# Patient Record
Sex: FEMALE | Employment: OTHER | URBAN - METROPOLITAN AREA
[De-identification: names, ages, dates, MRNs, and addresses within clinical notes are randomized per-mention and may not be internally consistent; named-entity substitution may affect disease eponyms.]

---

## 2017-05-18 ENCOUNTER — HOSPITAL ENCOUNTER (OUTPATIENT)
Dept: RADIOLOGY | Facility: OTHER | Age: 72
Discharge: HOME | End: 2017-05-18
Attending: INTERNAL MEDICINE
Payer: MEDICARE

## 2017-05-18 DIAGNOSIS — Z87.891 EX-SMOKER: ICD-10-CM

## 2017-05-18 DIAGNOSIS — R19.00 ABDOMINAL SWELLING: ICD-10-CM

## 2017-05-18 PROCEDURE — 76700 US EXAM ABDOM COMPLETE: CPT | Mod: TC

## 2017-05-18 PROCEDURE — 71020 XR CHEST PA AND LATERAL: CPT | Mod: 26,,, | Performed by: RADIOLOGY

## 2017-05-18 PROCEDURE — 71020 XR CHEST PA AND LATERAL: CPT | Mod: TC

## 2017-05-18 PROCEDURE — 76700 US EXAM ABDOM COMPLETE: CPT | Mod: 26,,, | Performed by: RADIOLOGY

## 2017-08-24 DIAGNOSIS — Z78.0 MENOPAUSE: ICD-10-CM

## 2017-08-24 NOTE — TELEPHONE ENCOUNTER
----- Message from Jamila Carney sent at 8/24/2017  1:49 PM CDT -----  Contact: Mica SMITH _1st Request  _  2nd Request  _  3rd Request    Who:Mica [employer]     Why:Mica was calling for a authorization for a refill on the patient medication  Prempro     What Number to Call Back:[phone]    When to Expect a call back: (Before the end of the day)   -- if call after 3:00 call back will be tomorrow.

## 2017-11-02 DIAGNOSIS — Z78.0 MENOPAUSE: ICD-10-CM

## 2017-11-02 RX ORDER — CONJUGATED ESTROGENS AND MEDROXYPROGESTERONE ACETATE .3; 1.5 MG/1; MG/1
TABLET, SUGAR COATED ORAL
Qty: 84 TABLET | Refills: 0 | Status: SHIPPED | OUTPATIENT
Start: 2017-11-02 | End: 2019-02-05 | Stop reason: SDUPTHER

## 2017-11-22 ENCOUNTER — TELEPHONE (OUTPATIENT)
Dept: OBSTETRICS AND GYNECOLOGY | Facility: CLINIC | Age: 72
End: 2017-11-22

## 2017-11-22 NOTE — TELEPHONE ENCOUNTER
Patient c/o vulva irritation, declines appointment today, patient scheduled in women's walk in today

## 2017-11-22 NOTE — TELEPHONE ENCOUNTER
----- Message from Jamila Carney sent at 11/22/2017 11:14 AM CST -----  Contact: Patient   X _1st Request  _  2nd Request  _  3rd Request    Who:TERRY FLORIAN [[id number]]    Why:Patient was returning a missed call back from the staff     What Number to Call Back:[phone]    When to Expect a call back: (Before the end of the day)   -- if call after 3:00 call back will be tomorrow.

## 2017-11-24 ENCOUNTER — TELEPHONE (OUTPATIENT)
Dept: OBSTETRICS AND GYNECOLOGY | Facility: CLINIC | Age: 72
End: 2017-11-24

## 2017-11-24 ENCOUNTER — OFFICE VISIT (OUTPATIENT)
Dept: OBSTETRICS AND GYNECOLOGY | Facility: CLINIC | Age: 72
End: 2017-11-24
Payer: MEDICARE

## 2017-11-24 VITALS
DIASTOLIC BLOOD PRESSURE: 72 MMHG | BODY MASS INDEX: 24.03 KG/M2 | WEIGHT: 149.5 LBS | HEIGHT: 66 IN | SYSTOLIC BLOOD PRESSURE: 116 MMHG

## 2017-11-24 DIAGNOSIS — N76.3 SUBACUTE VULVITIS: Primary | ICD-10-CM

## 2017-11-24 DIAGNOSIS — B37.9 CANDIDA ALBICANS INFECTION: Primary | ICD-10-CM

## 2017-11-24 LAB
CANDIDA RRNA VAG QL PROBE: POSITIVE
G VAGINALIS RRNA GENITAL QL PROBE: NEGATIVE
T VAGINALIS RRNA GENITAL QL PROBE: NEGATIVE

## 2017-11-24 PROCEDURE — 99999 PR PBB SHADOW E&M-EST. PATIENT-LVL IV: CPT | Mod: PBBFAC,,,

## 2017-11-24 PROCEDURE — 99214 OFFICE O/P EST MOD 30 MIN: CPT | Mod: PBBFAC

## 2017-11-24 PROCEDURE — 99214 OFFICE O/P EST MOD 30 MIN: CPT | Mod: S$PBB,,, | Performed by: OBSTETRICS & GYNECOLOGY

## 2017-11-24 PROCEDURE — 87660 TRICHOMONAS VAGIN DIR PROBE: CPT

## 2017-11-24 PROCEDURE — 87480 CANDIDA DNA DIR PROBE: CPT

## 2017-11-24 RX ORDER — NAPROXEN SODIUM 220 MG/1
81 TABLET, FILM COATED ORAL DAILY
COMMUNITY
End: 2019-04-09

## 2017-11-24 RX ORDER — FLUCONAZOLE 200 MG/1
200 TABLET ORAL EVERY OTHER DAY
Qty: 2 TABLET | Refills: 0 | Status: SHIPPED | OUTPATIENT
Start: 2017-11-24 | End: 2017-11-27

## 2017-11-24 RX ORDER — MAGNESIUM 30 MG
TABLET ORAL ONCE
COMMUNITY

## 2017-11-24 RX ORDER — DEXTROAMPHETAMINE SACCHARATE, AMPHETAMINE ASPARTATE, DEXTROAMPHETAMINE SULFATE AND AMPHETAMINE SULFATE 2.5; 2.5; 2.5; 2.5 MG/1; MG/1; MG/1; MG/1
1 TABLET ORAL 2 TIMES DAILY
Refills: 0 | COMMUNITY
Start: 2017-10-16 | End: 2019-07-03 | Stop reason: DRUGHIGH

## 2017-11-24 NOTE — PROGRESS NOTES
Laura Smith is a 72 y.o. female  [facility] with complaint of vulvar irritation off and on x 3 months.  She reports itching, and denies odor.  She denies abnormal discharge.    Pt sees Dr. Chon CHATMAN for her OB/GYN care.    ROS:  GENERAL: No fever, chills, fatigability or weight loss.  VULVAR: No pain, no lesions , reports discomfort and itching  VAGINAL: No relaxation, no abnormal bleeding and no lesions.  ABDOMEN: No abdominal pain. Denies nausea. Denies vomiting. No diarrhea. No constipation  BREAST: Denies pain. No lumps. No discharge.  URINARY: No incontinence, no nocturia, no frequency and no dysuria.  CARDIOVASCULAR: No chest pain. No shortness of breath. No leg cramps.  NEUROLOGICAL: No headaches. No vision changes.      Review of patient's allergies indicates:   Allergen Reactions    Demerol [meperidine] Nausea And Vomiting       Current Outpatient Prescriptions:     aripiprazole (ABILIFY) 2 MG Tab, , Disp: , Rfl:     aspirin 81 MG Chew, Take 81 mg by mouth once daily., Disp: , Rfl:     escitalopram oxalate (LEXAPRO) 20 MG tablet, Take 1 tablet by mouth once daily., Disp: , Rfl:     levothyroxine (SYNTHROID) 75 MCG tablet, TAKE 1 TABLET ONE TIME DAILY, Disp: 90 tablet, Rfl: 3    lorazepam (ATIVAN) 2 MG Tab, TAKE 1/2 TABLET TWICE DAILY, Disp: 90 tablet, Rfl: 1    magnesium 30 mg Tab, Take by mouth once., Disp: , Rfl:     MULTIVITAMIN W-MINERALS/LUTEIN (CENTRUM SILVER ORAL), Take 1 tablet by mouth once daily.  , Disp: , Rfl:     PREMPRO 0.3-1.5 mg per tablet, TAKE ONE TABLET BY MOUTH ONCE DAILY, Disp: 84 tablet, Rfl: 0    amoxicillin (AMOXIL) 250 MG capsule, Take 250 mg by mouth once as needed., Disp: , Rfl:     dextroamphetamine-amphetamine 10 mg Tab, Take 1 tablet by mouth 2 (two) times daily., Disp: , Rfl: 0    esomeprazole (NEXIUM) 40 MG capsule, Take 1 capsule (40 mg total) by mouth 2 (two) times daily before meals., Disp: 60 capsule, Rfl: 2    lorazepam  "(ATIVAN) 1 MG tablet, Take 1 mg by mouth every evening., Disp: , Rfl:     meloxicam (MOBIC) 15 MG tablet, Take 1 tablet (15 mg total) by mouth once daily. For back pain/sciatica., Disp: 30 tablet, Rfl: 3    Past Medical History:   Diagnosis Date    Allergy     Arthritis     Depression     Lumbar disc disease     PONV (postoperative nausea and vomiting)     Thyroid disease      Past Surgical History:   Procedure Laterality Date    DILATION AND CURETTAGE OF UTERUS  2016    FRACTURE SURGERY      Right wrist, long ago     HYSTEROSCOPY  2016    JOINT REPLACEMENT      right TKA    KNEE SURGERY Right     X 2 2014 & 2015    LASER LAPAROSCOPY      age 36    SHOULDER SURGERY       right X 1 2005 left X 2 2006 & unkl     TONSILLECTOMY      as a child, 5 y/o     WRIST SURGERY Right      Social History   Substance Use Topics    Smoking status: Former Smoker    Smokeless tobacco: Never Used    Alcohol use Yes      Comment: pony beer daily, martini weekend      OB History    Para Term  AB Living   0 0 0 0 0 0   SAB TAB Ectopic Multiple Live Births   0 0 0 0               /72   Ht 5' 6" (1.676 m)   Wt 67.8 kg (149 lb 7.6 oz)   BMI 24.13 kg/m²     PHYSICAL EXAM:  GENERAL: Calm and appropriate affect, alert, oriented x4  SKIN: Color appropriate for race, warm and dry, clean and intact with no rashes.  RESP: Even, unlabored breathing  ABDOMEN: Soft, nontender, no masses.  :   Normal external female genitalia without lesions.Vulva with erythema . Normal hair distribution. Adequate perineal body, normal urethral meatus.  Vagina pink and well rugated, no lesions, vaginal discharge - minimal, no odor  No significant cystocele or rectocele.  Cervix pink without discharge or lesions, no cervical motion tenderness.  Uterus 4-6 weeks size, regular, mobile and nontender.  Adnexa: normal adnexa in size, nontender and no masses        ASSESSMENT / PLAN:    ICD-10-CM ICD-9-CM    1. Subacute " vulvitis N76.3 616.10 Vaginosis Screen by DNA Probe     Subacute vulvitis  -     Vaginosis Screen by DNA Probe          Patient was counseled today on vaginitis prevention including :  a. avoiding feminine products such as deoderant soaps, body wash, bubble bath, douches, scented toilet paper, deoderant tampons or pads, feminine wipes, chronic pad use, etc.  b. avoiding other vulvovaginal irritants such as long hot baths, humidity, tight, synthetic clothing, chlorine and sitting around in wet bathing suits  c. wearing cotton underwear, avoiding thong underwear and no underwear to bed  d. taking showers instead of baths and use a hair dryer on cool setting afterwards to dry  e. wearing cotton to exercise and shower immediately after exercise and change clothes  f. using polyurethane condoms without spermicide if sexually active and symptoms are triggered by intercourse  g. Discussed use of vagisil, along with repHresh and probiotics    FOLLOW UP:   Pending lab results, PRN lack of improvement.

## 2018-04-04 ENCOUNTER — HOSPITAL ENCOUNTER (OUTPATIENT)
Dept: RADIOLOGY | Facility: OTHER | Age: 73
Discharge: HOME | End: 2018-04-04
Attending: INTERNAL MEDICINE
Payer: MEDICARE

## 2018-04-04 DIAGNOSIS — M79.662 PAIN OF LEFT CALF: ICD-10-CM

## 2018-04-04 PROCEDURE — 93971 EXTREMITY STUDY: CPT | Mod: 26,,, | Performed by: RADIOLOGY

## 2018-04-04 PROCEDURE — 93971 EXTREMITY STUDY: CPT | Mod: TC

## 2019-02-05 DIAGNOSIS — Z78.0 MENOPAUSE: ICD-10-CM

## 2019-02-05 RX ORDER — CONJUGATED ESTROGENS AND MEDROXYPROGESTERONE ACETATE .3; 1.5 MG/1; MG/1
TABLET, SUGAR COATED ORAL
Qty: 90 TABLET | Refills: 0 | Status: SHIPPED | OUTPATIENT
Start: 2019-02-05 | End: 2019-07-03

## 2019-03-19 ENCOUNTER — TELEPHONE (OUTPATIENT)
Dept: OBSTETRICS AND GYNECOLOGY | Facility: CLINIC | Age: 74
End: 2019-03-19

## 2019-03-19 NOTE — TELEPHONE ENCOUNTER
Called and spoke to pt. Received fax from Dr. Lockwood's office for referral. Scheduled 4/9/19 at 1pm, pt instructed on time and location. Pt verbalized understanding and no further questions.

## 2019-03-19 NOTE — TELEPHONE ENCOUNTER
----- Message from Sneha Nagy sent at 3/19/2019  1:17 PM CDT -----  Contact: pt  Name of Who is Calling: TERRY FLORIAN [[id number]]    What is the request in detail: patient is requesting a call back in regards to getting another DNC or hysterectomy, patient states Dr Lockwood faxed over paperwork in regards to her visit with him...Please contact to further discuss and advise      Can the clinic reply by [facility]: no     What Number to Call Back if not in [facility]: [phone]

## 2019-04-09 ENCOUNTER — OFFICE VISIT (OUTPATIENT)
Dept: OBSTETRICS AND GYNECOLOGY | Facility: CLINIC | Age: 74
End: 2019-04-09
Payer: MEDICARE

## 2019-04-09 VITALS
DIASTOLIC BLOOD PRESSURE: 80 MMHG | WEIGHT: 150.81 LBS | SYSTOLIC BLOOD PRESSURE: 116 MMHG | BODY MASS INDEX: 24.34 KG/M2

## 2019-04-09 DIAGNOSIS — N88.2 CERVICAL STENOSIS (UTERINE CERVIX): ICD-10-CM

## 2019-04-09 DIAGNOSIS — N95.0 POSTMENOPAUSAL BLEEDING: Primary | ICD-10-CM

## 2019-04-09 PROCEDURE — 99999 PR PBB SHADOW E&M-EST. PATIENT-LVL III: CPT | Mod: PBBFAC,,, | Performed by: OBSTETRICS & GYNECOLOGY

## 2019-04-09 PROCEDURE — 99999 PR PBB SHADOW E&M-EST. PATIENT-LVL III: ICD-10-PCS | Mod: PBBFAC,,, | Performed by: OBSTETRICS & GYNECOLOGY

## 2019-04-09 PROCEDURE — 58100 PR BIOPSY OF UTERUS LINING: ICD-10-PCS | Mod: 53,S$PBB,, | Performed by: OBSTETRICS & GYNECOLOGY

## 2019-04-09 PROCEDURE — 99213 OFFICE O/P EST LOW 20 MIN: CPT | Mod: PBBFAC | Performed by: OBSTETRICS & GYNECOLOGY

## 2019-04-09 PROCEDURE — 58100 BIOPSY OF UTERUS LINING: CPT | Mod: 53,S$PBB,, | Performed by: OBSTETRICS & GYNECOLOGY

## 2019-04-09 PROCEDURE — 99213 PR OFFICE/OUTPT VISIT, EST, LEVL III, 20-29 MIN: ICD-10-PCS | Mod: 25,S$PBB,, | Performed by: OBSTETRICS & GYNECOLOGY

## 2019-04-09 PROCEDURE — 99213 OFFICE O/P EST LOW 20 MIN: CPT | Mod: 25,S$PBB,, | Performed by: OBSTETRICS & GYNECOLOGY

## 2019-04-09 PROCEDURE — 58100 BIOPSY OF UTERUS LINING: CPT | Mod: 52,25,PBBFAC | Performed by: OBSTETRICS & GYNECOLOGY

## 2019-04-09 NOTE — PROGRESS NOTES
CC:  Postmenopausal bleeding    HPI:  Laura Smith is a 74 y.o. female patient  who presents today for evaluation of postmenopausal bleeding. Patient had a hysteroscopic procedure/D&C in 2016.  Patient is being followed by Dr. Austin Lockwood and reported scant menopausal spotting approximately 2-3 months ago.  Transvaginal ultrasound performed in 's office revealed a slightly thickened endometrium of 5 mm.  Patient reports that she had been on Prempro and was taking this 3-4 times per week.  Patient denies pain or bloating.  Patient has discontinued HRT and reports no menopausal symptoms.    No LMP recorded. Patient is postmenopausal.    Past Medical History:   Diagnosis Date    Allergy     Arthritis     Depression     Lumbar disc disease     PONV (postoperative nausea and vomiting)     Thyroid disease        Past Surgical History:   Procedure Laterality Date    DEBRIDEMENT-WOUND                                                     KNEE RIGHT Right 10/14/2015    [facility]    DILATION AND CURETTAGE OF UTERUS  2016    FRACTURE SURGERY      Right wrist, long ago     HYSTEROSCOPY  2016    THUXOMTXYOVJ-FXVOVBKE-SYQZWRPTY N/A 2016    [facility] Givens IV, [facility] at [facility]    JOINT REPLACEMENT      right TKA    KNEE SURGERY Right     X 2 2014 & 2015    LASER LAPAROSCOPY      age 36    RELEASE-TENDON POPLITEUS Right 2015    [facility]    REVISION-ARTHROPLASTY-KNEE-TOTAL WITH NAVIGATION Right 2015    [facility]    SHOULDER SURGERY       right X 1 2005 left X 2 2006 & unkl     TONSILLECTOMY      as a child, 5 y/o     WRIST SURGERY Right          ROS:  GENERAL: Feeling well overall.   SKIN: Denies rash or lesions.   HEAD: Denies head injury or headache.   NODES: Denies enlarged lymph nodes.   CHEST: Denies chest pain or shortness of breath.    CARDIOVASCULAR: Denies palpitations or left sided chest pain.   ABDOMEN: No abdominal pain, nausea, vomiting or rectal bleeding.   URINARY: No dysuria, hematuria, or burning on urination.  REPRODUCTIVE: See HPI.   BREASTS: Denies pain, lumps, or nipple discharge.   HEMATOLOGIC: No easy bruisability or excessive bleeding.   MUSCULOSKELETAL: Denies joint pain or swelling.   NEUROLOGIC: Denies syncope or weakness.   PSYCHIATRIC: Denies depression.    PE:   APPEARANCE: Well nourished, well developed, in no acute distress.  ABDOMEN: Soft. No tenderness or masses. No hernias. No CVA tenderness.  VULVA: No lesions. Normal female genitalia.  URETHRAL MEATUS: Normal size and location, no lesions, no prolapse.  URETHRA: No masses, tenderness, prolapse or scarring.  VAGINA:  Atrophic, no discharge, no significant cystocele or rectocele.  CERVIX: No lesions and discharge. Stenotic internal os  UTERUS:  6-8 week size, regular shape, mobile, non-tender, bladder base nontender.  ADNEXA: No masses, tenderness or CDS nodularity.  ANUS PERINEUM: Normal.    Diagnosis:  1. Postmenopausal bleeding    2. Cervical stenosis (uterine cervix)      Procedure:    ENDOMETRIAL BIOPSY:    74 y.o. year old female patient   with scant postmenopausal spotting/bleeding presents for an endometrial biopsy.    No LMP recorded. Patient is postmenopausal.    PRE ENDOMETRIAL BIOPSY COUNSELING:  The patient was informed of the risk of bleeding, infection, uterine perforation and pain. She was counseled on the alternatives to endometrial biopsy and agrees to proceed.    PROCEDURE:  TIME OUT PERFORMED.  The cervix was visualized with a speculum.  A single tooth tenaculum was placed on the anterior lip prior to the biopsy.  A sterile endometrial pipelle was unable to be passed into the uterine cavity.  A sterile dilator was attempted to be placed and was not able to pass through the internal os of the cervix due to stenosis.    There was no  specimen.      The patient tolerated the procedure well.    POST ENDOMETRIAL BIOPSY COUNSELING:  Manage post biopsy cramping with NSAIDs or Tylenol.  Expect spotting or light bleeding for a few days.  Report bleeding heavier than a period, fever > 101.0 F, worsening pain or a foul smelling vaginal discharge.    This procedure will be modified with a 52.    PLAN:  Patient was counseled today on postmenopausal bleeding and HRT use.  Patient has declined to use HRT and has had no bleeding since discontinuation of HRT.    An endometrial biopsy was attempted and and I was unable to pass the Pipelle into the uterine cavity. After 3 attempts, the procedure was terminated/discontinued.    Patient is instructed to contact office if any bleeding is noted. I would at that time recommend hysteroscopic evaluation/D&C and a hysterectomy.    Dr. Lockwood's office is to be contacted via the [facility] EMR with these findings.    Follow-up:  DINA Givens IV, MD

## 2019-04-09 NOTE — LETTER
April 9, 2019      Trip Lockwood MD  [address]  [address]  [facility] [address]           [facility] [id number] [facility] [address]  [address] [facility] [address]  [facility] [address]  Phone: [phone]  Fax: [phone]          Patient: Laura Smith   MR Number: [id number]   YOB: 1945   Date of Visit: 4/9/2019       Dear Dr. Trip Lockwood:    Thank you for referring Laura Smith to me for evaluation. Attached you will find relevant portions of my assessment and plan of care.    If you have questions, please do not hesitate to call me. I look forward to following Laura Smith along with you.    Sincerely,    Maynor Givens IV, MD    Enclosure  CC:  No Recipients    If you would like to receive this communication electronically, please contact [email or web address] or [phone] to request more information on [employer] access.    For providers and/or their staff who would like to refer a patient to [facility], please contact us through our one-stop-shop provider referral line, [facility], at [phone].    If you feel you have received this communication in error or would no longer like to receive these types of communications, please e-mail [email or web address]

## 2019-07-09 ENCOUNTER — LAB VISIT (OUTPATIENT)
Dept: LAB | Facility: OTHER | Age: 74
End: 2019-07-09
Attending: INTERNAL MEDICINE
Payer: MEDICARE

## 2019-07-09 DIAGNOSIS — E78.01 HYPERLIPIDEMIA TYPE II: ICD-10-CM

## 2019-07-09 DIAGNOSIS — R73.9 ELEVATED BLOOD SUGAR: ICD-10-CM

## 2019-07-09 DIAGNOSIS — E03.1 CONGENITAL HYPOTHYROIDISM WITHOUT GOITER: ICD-10-CM

## 2019-07-09 LAB
ALBUMIN SERPL BCP-MCNC: 3.9 G/DL (ref 3.5–5.2)
ALP SERPL-CCNC: 95 U/L (ref 55–135)
ALT SERPL W/O P-5'-P-CCNC: 17 U/L (ref 10–44)
ANION GAP SERPL CALC-SCNC: 4 MMOL/L (ref 8–16)
AST SERPL-CCNC: 21 U/L (ref 10–40)
BASOPHILS # BLD AUTO: 0.01 K/UL (ref 0–0.2)
BASOPHILS NFR BLD: 0.2 % (ref 0–1.9)
BILIRUB SERPL-MCNC: 0.6 MG/DL (ref 0.1–1)
BUN SERPL-MCNC: 22 MG/DL (ref 8–23)
CALCIUM SERPL-MCNC: 9.5 MG/DL (ref 8.7–10.5)
CHLORIDE SERPL-SCNC: 103 MMOL/L (ref 95–110)
CHOLEST SERPL-MCNC: 240 MG/DL (ref 120–199)
CHOLEST/HDLC SERPL: 2.6 {RATIO} (ref 2–5)
CO2 SERPL-SCNC: 33 MMOL/L (ref 23–29)
CREAT SERPL-MCNC: 0.8 MG/DL (ref 0.5–1.4)
DIFFERENTIAL METHOD: ABNORMAL
EOSINOPHIL # BLD AUTO: 0.1 K/UL (ref 0–0.5)
EOSINOPHIL NFR BLD: 1.7 % (ref 0–8)
ERYTHROCYTE [DISTWIDTH] IN BLOOD BY AUTOMATED COUNT: 13.9 % (ref 11.5–14.5)
EST. GFR  (AFRICAN AMERICAN): >60 ML/MIN/1.73 M^2
EST. GFR  (NON AFRICAN AMERICAN): >60 ML/MIN/1.73 M^2
ESTIMATED AVG GLUCOSE: 108 MG/DL (ref 68–131)
GLUCOSE SERPL-MCNC: 99 MG/DL (ref 70–110)
HBA1C MFR BLD HPLC: 5.4 % (ref 4–5.6)
HCT VFR BLD AUTO: 43.1 % (ref 37–48.5)
HDLC SERPL-MCNC: 93 MG/DL (ref 40–75)
HDLC SERPL: 38.8 % (ref 20–50)
HGB BLD-MCNC: 14.1 G/DL (ref 12–16)
LDLC SERPL CALC-MCNC: 136.8 MG/DL (ref 63–159)
LYMPHOCYTES # BLD AUTO: 1.5 K/UL (ref 1–4.8)
LYMPHOCYTES NFR BLD: 32.3 % (ref 18–48)
MCH RBC QN AUTO: 31.1 PG (ref 27–31)
MCHC RBC AUTO-ENTMCNC: 32.7 G/DL (ref 32–36)
MCV RBC AUTO: 95 FL (ref 82–98)
MONOCYTES # BLD AUTO: 0.5 K/UL (ref 0.3–1)
MONOCYTES NFR BLD: 9.8 % (ref 4–15)
NEUTROPHILS # BLD AUTO: 2.6 K/UL (ref 1.8–7.7)
NEUTROPHILS NFR BLD: 56 % (ref 38–73)
NONHDLC SERPL-MCNC: 147 MG/DL
PLATELET # BLD AUTO: 263 K/UL (ref 150–350)
PMV BLD AUTO: 9.8 FL (ref 9.2–12.9)
POTASSIUM SERPL-SCNC: 5.3 MMOL/L (ref 3.5–5.1)
PROT SERPL-MCNC: 6.8 G/DL (ref 6–8.4)
RBC # BLD AUTO: 4.54 M/UL (ref 4–5.4)
SODIUM SERPL-SCNC: 140 MMOL/L (ref 136–145)
TRIGL SERPL-MCNC: 51 MG/DL (ref 30–150)
TSH SERPL DL<=0.005 MIU/L-ACNC: 3.32 UIU/ML (ref 0.4–4)
WBC # BLD AUTO: 4.7 K/UL (ref 3.9–12.7)

## 2019-07-09 PROCEDURE — 85025 COMPLETE CBC W/AUTO DIFF WBC: CPT

## 2019-07-09 PROCEDURE — 36415 COLL VENOUS BLD VENIPUNCTURE: CPT

## 2019-07-09 PROCEDURE — 83036 HEMOGLOBIN GLYCOSYLATED A1C: CPT

## 2019-07-09 PROCEDURE — 80061 LIPID PANEL: CPT

## 2019-07-09 PROCEDURE — 84443 ASSAY THYROID STIM HORMONE: CPT

## 2019-07-09 PROCEDURE — 80053 COMPREHEN METABOLIC PANEL: CPT

## 2019-10-29 ENCOUNTER — TELEPHONE (OUTPATIENT)
Dept: OBSTETRICS AND GYNECOLOGY | Facility: CLINIC | Age: 74
End: 2019-10-29

## 2019-10-29 DIAGNOSIS — Z12.31 VISIT FOR SCREENING MAMMOGRAM: Primary | ICD-10-CM

## 2019-10-29 NOTE — TELEPHONE ENCOUNTER
----- Message from Rhonda Shah sent at 10/29/2019  1:33 PM CDT -----  Contact: Patient   Patient called regarding a request for a Mammo order on behalf of [facility]. The patient was notified by the imaging center to schedule an appointment but require an order to be faxed before seen. [facility] can be reached at [phone].

## 2020-11-17 ENCOUNTER — PES CALL (OUTPATIENT)
Dept: ADMINISTRATIVE | Facility: CLINIC | Age: 75
End: 2020-11-17

## 2020-11-19 ENCOUNTER — TELEPHONE (OUTPATIENT)
Dept: OBSTETRICS AND GYNECOLOGY | Facility: CLINIC | Age: 75
End: 2020-11-19

## 2020-11-19 DIAGNOSIS — Z12.31 SCREENING MAMMOGRAM, ENCOUNTER FOR: Primary | ICD-10-CM

## 2020-11-19 NOTE — TELEPHONE ENCOUNTER
----- Message from Cheyenne Corea sent at 11/19/2020  3:29 PM CST -----  Name of Who is Calling: TERRY FLORIAN  What is the request in detail:The patient is calling to request the doctor put in an order for her annual mammogram. Please advise Can the clinic reply by [facility]: No What Number to Call Back if not in [facility]: [phone]

## 2020-12-02 ENCOUNTER — HOSPITAL ENCOUNTER (OUTPATIENT)
Dept: RADIOLOGY | Facility: OTHER | Age: 75
Discharge: HOME | End: 2020-12-02
Attending: OBSTETRICS & GYNECOLOGY
Payer: MEDICARE

## 2020-12-02 VITALS — BODY MASS INDEX: 23.38 KG/M2 | HEIGHT: 66 IN | WEIGHT: 145.5 LBS

## 2020-12-02 DIAGNOSIS — Z12.31 SCREENING MAMMOGRAM, ENCOUNTER FOR: ICD-10-CM

## 2020-12-02 PROCEDURE — 77063 BREAST TOMOSYNTHESIS BI: CPT | Mod: 26,,, | Performed by: RADIOLOGY

## 2020-12-02 PROCEDURE — 77067 SCR MAMMO BI INCL CAD: CPT | Mod: 26,,, | Performed by: RADIOLOGY

## 2020-12-02 PROCEDURE — 77063 MAMMO DIGITAL SCREENING BILAT WITH TOMO: ICD-10-PCS | Mod: 26,,, | Performed by: RADIOLOGY

## 2020-12-02 PROCEDURE — 77067 SCR MAMMO BI INCL CAD: CPT | Mod: TC

## 2020-12-02 PROCEDURE — 77067 MAMMO DIGITAL SCREENING BILAT WITH TOMO: ICD-10-PCS | Mod: 26,,, | Performed by: RADIOLOGY

## 2020-12-15 ENCOUNTER — TELEPHONE (OUTPATIENT)
Dept: OBSTETRICS AND GYNECOLOGY | Facility: CLINIC | Age: 75
End: 2020-12-15

## 2020-12-15 ENCOUNTER — OFFICE VISIT (OUTPATIENT)
Dept: OBSTETRICS AND GYNECOLOGY | Facility: CLINIC | Age: 75
End: 2020-12-15
Payer: MEDICARE

## 2020-12-15 VITALS
SYSTOLIC BLOOD PRESSURE: 116 MMHG | BODY MASS INDEX: 24.03 KG/M2 | DIASTOLIC BLOOD PRESSURE: 84 MMHG | HEIGHT: 66 IN | WEIGHT: 149.5 LBS

## 2020-12-15 DIAGNOSIS — Z01.419 ENCOUNTER FOR GYNECOLOGICAL EXAMINATION WITHOUT ABNORMAL FINDING: Primary | ICD-10-CM

## 2020-12-15 DIAGNOSIS — Z12.31 SCREENING MAMMOGRAM, ENCOUNTER FOR: ICD-10-CM

## 2020-12-15 PROCEDURE — G0101 PR CA SCREEN;PELVIC/BREAST EXAM: ICD-10-PCS | Mod: S$PBB,,, | Performed by: OBSTETRICS & GYNECOLOGY

## 2020-12-15 PROCEDURE — 99999 PR PBB SHADOW E&M-EST. PATIENT-LVL III: ICD-10-PCS | Mod: PBBFAC,,, | Performed by: OBSTETRICS & GYNECOLOGY

## 2020-12-15 PROCEDURE — G0101 CA SCREEN;PELVIC/BREAST EXAM: HCPCS | Mod: PBBFAC | Performed by: OBSTETRICS & GYNECOLOGY

## 2020-12-15 PROCEDURE — 99213 OFFICE O/P EST LOW 20 MIN: CPT | Mod: PBBFAC | Performed by: OBSTETRICS & GYNECOLOGY

## 2020-12-15 PROCEDURE — 99999 PR PBB SHADOW E&M-EST. PATIENT-LVL III: CPT | Mod: PBBFAC,,, | Performed by: OBSTETRICS & GYNECOLOGY

## 2020-12-15 PROCEDURE — G0101 CA SCREEN;PELVIC/BREAST EXAM: HCPCS | Mod: S$PBB,,, | Performed by: OBSTETRICS & GYNECOLOGY

## 2020-12-15 NOTE — PROGRESS NOTES
Medicare breast and pelvic exam     Laura aguilera 75 y.o. year old  female who presents for routine GYN exam.  She is postmenopausal.  Patient denies any postmenopausal bleeding.    Past Medical History:   Diagnosis Date    Allergy     Arthritis     Depression     Lumbar disc disease     PONV (postoperative nausea and vomiting)     Thyroid disease        Past Surgical History:   Procedure Laterality Date    DILATION AND CURETTAGE OF UTERUS  2016    FRACTURE SURGERY      Right wrist, long ago     HYSTEROSCOPY  2016    JOINT REPLACEMENT      right TKA    KNEE SURGERY Right     X 2 2014 & 2015    LASER LAPAROSCOPY      age 36    SHOULDER SURGERY       right X 1 2005 left X 2 2006 & unkl     TONSILLECTOMY      as a child, 7 y/o     WRIST SURGERY Right        OB History        0    Para   0    Term   0       0    AB   0    Living   0       SAB   0    TAB   0    Ectopic   0    Multiple   0    Live Births                       ROS:  GENERAL: Feeling well overall.   SKIN: Denies rash or lesions.   HEAD: Denies head injury or headache.   NODES: Denies enlarged lymph nodes.   CHEST: Denies chest pain or shortness of breath.   CARDIOVASCULAR: Denies palpitations or left sided chest pain.   ABDOMEN: No abdominal pain, nausea, vomiting or rectal bleeding.   URINARY: No dysuria, hematuria, or burning on urination.  REPRODUCTIVE: See HPI.   BREASTS: Denies pain, lumps, or nipple discharge.   HEMATOLOGIC: No easy bruisability or excessive bleeding.   MUSCULOSKELETAL: Denies joint pain or swelling.   NEUROLOGIC: Denies syncope or weakness.   PSYCHIATRIC: Denies depression.    PE:   APPEARANCE: Well nourished, well developed, in no acute distress.  NECK: Neck symmetric without masses or thyromegaly.  BREASTS: Symmetrical, no skin changes or visible lesions. No palpable masses, nipple discharge or adenopathy bilaterally.  ABDOMEN: Soft. No tenderness or masses. No hernias.  No CVA tenderness.  VULVA: No lesions. Normal female genitalia.  URETHRAL MEATUS: Normal size and location, no lesions, no prolapse.  URETHRA: No masses, tenderness, prolapse or scarring.  VAGINA: Atrophic.  No lesions, no discharge, no significant cystocele or rectocele.  CERVIX: No lesions and discharge.  UTERUS: Normal size, regular shape, mobile, non-tender, bladder base nontender.  ADNEXA: No masses, tenderness or CDS nodularity.  ANUS PERINEUM: Normal.    Diagnosis:  1. Encounter for gynecological examination without abnormal finding    2. Screening mammogram, encounter for        PLAN:    Orders Placed This Encounter    Mammo Digital Screening Bilat w/ [facility]       Patient was counseled today on postmenopausal issues.    Screening mammogram for 2021 ordered today.    Benign bimanual exam today.    Follow-up in 1 year.    Maynor Givens IV, MD

## 2020-12-15 NOTE — TELEPHONE ENCOUNTER
Spoke to pt and let her know we looked in exam room and in the waiting room and did not find an earring. Nothing was turned in at  either. Pt VU and will look in her car.

## 2020-12-15 NOTE — TELEPHONE ENCOUNTER
----- Message from Martín Booker sent at 12/15/2020  1:23 PM CST -----  PT HAD A APPT THIS MORNING @ 10 AM SHE LOST A GOLD EAR RING DID YOU FIND IT SHE CAN BE REACHED @ [phone]

## 2020-12-29 ENCOUNTER — CLINICAL SUPPORT (OUTPATIENT)
Dept: URGENT CARE | Facility: CLINIC | Age: 75
End: 2020-12-29
Payer: MEDICARE

## 2020-12-29 DIAGNOSIS — Z11.9 SCREENING EXAMINATION FOR UNSPECIFIED INFECTIOUS DISEASE: Primary | ICD-10-CM

## 2020-12-29 LAB
CTP QC/QA: YES
SARS-COV-2 RDRP RESP QL NAA+PROBE: NEGATIVE

## 2020-12-29 PROCEDURE — 99211 PR OFFICE/OUTPT VISIT, EST, LEVL I: ICD-10-PCS | Mod: S$GLB,,, | Performed by: NURSE PRACTITIONER

## 2020-12-29 PROCEDURE — 99211 OFF/OP EST MAY X REQ PHY/QHP: CPT | Mod: S$GLB,,, | Performed by: NURSE PRACTITIONER

## 2020-12-29 PROCEDURE — U0002 COVID-19 LAB TEST NON-CDC: HCPCS | Mod: QW,CR,S$GLB, | Performed by: NURSE PRACTITIONER

## 2020-12-29 PROCEDURE — U0002: ICD-10-PCS | Mod: QW,CR,S$GLB, | Performed by: NURSE PRACTITIONER

## 2021-01-13 ENCOUNTER — IMMUNIZATION (OUTPATIENT)
Dept: INTERNAL MEDICINE | Facility: CLINIC | Age: 76
End: 2021-01-13
Payer: MEDICARE

## 2021-01-13 DIAGNOSIS — Z23 NEED FOR VACCINATION: ICD-10-CM

## 2021-01-13 PROCEDURE — 91300 COVID-19, MRNA, LNP-S, PF, 30 MCG/0.3 ML DOSE VACCINE: CPT | Mod: PBBFAC

## 2021-02-04 ENCOUNTER — IMMUNIZATION (OUTPATIENT)
Dept: INTERNAL MEDICINE | Facility: CLINIC | Age: 76
End: 2021-02-04
Payer: MEDICARE

## 2021-02-04 DIAGNOSIS — Z23 NEED FOR VACCINATION: Primary | ICD-10-CM

## 2021-02-04 PROCEDURE — 0002A PR IMMUNIZ ADMIN, SARS-COV-2 COVID-19 VACC, 30MCG/0.3ML, 2ND DOSE: CPT | Mod: PBBFAC

## 2021-02-04 PROCEDURE — 91300 PR SARS-COV- 2 COVID-19 VACCINE, NO PRSV, 30MCG/0.3ML, IM: CPT | Mod: PBBFAC

## 2021-02-04 RX ADMIN — RNA INGREDIENT BNT-162B2 0.3 ML: 0.23 INJECTION, SUSPENSION INTRAMUSCULAR at 04:02

## 2021-03-18 ENCOUNTER — PATIENT MESSAGE (OUTPATIENT)
Dept: RESEARCH | Facility: HOSPITAL | Age: 76
End: 2021-03-18

## 2021-03-26 ENCOUNTER — PATIENT MESSAGE (OUTPATIENT)
Dept: RESEARCH | Facility: HOSPITAL | Age: 76
End: 2021-03-26

## 2021-04-24 ENCOUNTER — HOSPITAL ENCOUNTER (EMERGENCY)
Facility: OTHER | Age: 76
Discharge: HOME | End: 2021-04-24
Attending: EMERGENCY MEDICINE
Payer: MEDICARE

## 2021-04-24 VITALS
WEIGHT: 146.5 LBS | BODY MASS INDEX: 23.54 KG/M2 | HEART RATE: 68 BPM | RESPIRATION RATE: 15 BRPM | TEMPERATURE: 98 F | DIASTOLIC BLOOD PRESSURE: 72 MMHG | HEIGHT: 66 IN | SYSTOLIC BLOOD PRESSURE: 150 MMHG | OXYGEN SATURATION: 98 %

## 2021-04-24 DIAGNOSIS — S09.90XA INJURY OF HEAD, INITIAL ENCOUNTER: ICD-10-CM

## 2021-04-24 DIAGNOSIS — S00.83XA FACIAL CONTUSION, INITIAL ENCOUNTER: ICD-10-CM

## 2021-04-24 DIAGNOSIS — S00.511A LIP ABRASION, INITIAL ENCOUNTER: ICD-10-CM

## 2021-04-24 DIAGNOSIS — S01.81XA FOREHEAD LACERATION, INITIAL ENCOUNTER: ICD-10-CM

## 2021-04-24 DIAGNOSIS — S63.501A RIGHT WRIST SPRAIN, INITIAL ENCOUNTER: Primary | ICD-10-CM

## 2021-04-24 LAB — HCV AB SERPL QL IA: NEGATIVE

## 2021-04-24 PROCEDURE — 99284 EMERGENCY DEPT VISIT MOD MDM: CPT | Mod: 25

## 2021-04-24 PROCEDURE — 29125 APPL SHORT ARM SPLINT STATIC: CPT

## 2021-04-24 PROCEDURE — 86803 HEPATITIS C AB TEST: CPT | Performed by: EMERGENCY MEDICINE

## 2021-04-24 PROCEDURE — 12011 RPR F/E/E/N/L/M 2.5 CM/<: CPT

## 2021-04-24 PROCEDURE — 25000003 PHARM REV CODE 250: Performed by: EMERGENCY MEDICINE

## 2021-04-24 RX ORDER — LIDOCAINE HYDROCHLORIDE 10 MG/ML
5 INJECTION INFILTRATION; PERINEURAL
Status: COMPLETED | OUTPATIENT
Start: 2021-04-24 | End: 2021-04-24

## 2021-04-24 RX ORDER — TRAMADOL HYDROCHLORIDE 50 MG/1
50 TABLET ORAL EVERY 6 HOURS PRN
Qty: 12 TABLET | Refills: 0 | Status: SHIPPED | OUTPATIENT
Start: 2021-04-24 | End: 2021-04-27

## 2021-04-24 RX ADMIN — LIDOCAINE HYDROCHLORIDE 5 ML: 10 INJECTION, SOLUTION INFILTRATION; PERINEURAL at 10:04

## 2021-10-06 ENCOUNTER — TELEPHONE (OUTPATIENT)
Dept: OBSTETRICS AND GYNECOLOGY | Facility: CLINIC | Age: 76
End: 2021-10-06

## 2021-10-06 DIAGNOSIS — Z12.31 SCREENING MAMMOGRAM, ENCOUNTER FOR: Primary | ICD-10-CM

## 2021-12-21 ENCOUNTER — HOSPITAL ENCOUNTER (OUTPATIENT)
Dept: RADIOLOGY | Facility: OTHER | Age: 76
Discharge: HOME | End: 2021-12-21
Attending: OBSTETRICS & GYNECOLOGY
Payer: MEDICARE

## 2021-12-21 DIAGNOSIS — Z12.31 SCREENING MAMMOGRAM, ENCOUNTER FOR: ICD-10-CM

## 2021-12-21 PROCEDURE — 77067 MAMMO DIGITAL SCREENING BILAT WITH TOMO: ICD-10-PCS | Mod: 26,,, | Performed by: RADIOLOGY

## 2021-12-21 PROCEDURE — 77063 BREAST TOMOSYNTHESIS BI: CPT | Mod: 26,,, | Performed by: RADIOLOGY

## 2021-12-21 PROCEDURE — 77067 SCR MAMMO BI INCL CAD: CPT | Mod: 26,,, | Performed by: RADIOLOGY

## 2021-12-21 PROCEDURE — 77067 SCR MAMMO BI INCL CAD: CPT | Mod: TC

## 2021-12-21 PROCEDURE — 77063 MAMMO DIGITAL SCREENING BILAT WITH TOMO: ICD-10-PCS | Mod: 26,,, | Performed by: RADIOLOGY

## 2022-01-04 ENCOUNTER — OFFICE VISIT (OUTPATIENT)
Dept: OBSTETRICS AND GYNECOLOGY | Facility: CLINIC | Age: 77
End: 2022-01-04
Payer: MEDICARE

## 2022-01-04 VITALS
DIASTOLIC BLOOD PRESSURE: 84 MMHG | HEIGHT: 66 IN | BODY MASS INDEX: 23.7 KG/M2 | SYSTOLIC BLOOD PRESSURE: 130 MMHG | WEIGHT: 147.5 LBS

## 2022-01-04 DIAGNOSIS — Z12.31 SCREENING MAMMOGRAM, ENCOUNTER FOR: ICD-10-CM

## 2022-01-04 DIAGNOSIS — Z01.411 ENCOUNTER FOR GYNECOLOGICAL EXAMINATION WITH ABNORMAL FINDING: Primary | ICD-10-CM

## 2022-01-04 DIAGNOSIS — N95.2 VAGINAL ATROPHY: ICD-10-CM

## 2022-01-04 PROCEDURE — G0101 PR CA SCREEN;PELVIC/BREAST EXAM: ICD-10-PCS | Mod: S$PBB,,, | Performed by: OBSTETRICS & GYNECOLOGY

## 2022-01-04 PROCEDURE — G0101 CA SCREEN;PELVIC/BREAST EXAM: HCPCS | Mod: S$PBB,,, | Performed by: OBSTETRICS & GYNECOLOGY

## 2022-01-04 PROCEDURE — 99999 PR PBB SHADOW E&M-EST. PATIENT-LVL III: CPT | Mod: PBBFAC,,, | Performed by: OBSTETRICS & GYNECOLOGY

## 2022-01-04 PROCEDURE — 99999 PR PBB SHADOW E&M-EST. PATIENT-LVL III: ICD-10-PCS | Mod: PBBFAC,,, | Performed by: OBSTETRICS & GYNECOLOGY

## 2022-01-04 PROCEDURE — G0101 CA SCREEN;PELVIC/BREAST EXAM: HCPCS | Mod: PBBFAC | Performed by: OBSTETRICS & GYNECOLOGY

## 2022-01-04 PROCEDURE — 99213 OFFICE O/P EST LOW 20 MIN: CPT | Mod: PBBFAC,25 | Performed by: OBSTETRICS & GYNECOLOGY

## 2022-01-05 NOTE — PROGRESS NOTES
Medicare breast and pelvic exam    Laura Smith is a 76 y.o. year old  patient who presents for a routine Medicare breast and pelvic exam.  She is postmenopausal.  Patient denies menopausal bleeding.  Patient reports that she is on no HRT.  No gynecologic complaints today.    Past Medical History:   Diagnosis Date    Allergy     Arthritis     Depression     Lumbar disc disease     PONV (postoperative nausea and vomiting)     Thyroid disease        Past Surgical History:   Procedure Laterality Date    DILATION AND CURETTAGE OF UTERUS  2016    FRACTURE SURGERY      Right wrist, long ago     HYSTEROSCOPY  2016    JOINT REPLACEMENT      right TKA    KNEE SURGERY Right     X 2 2014 & 2015    LASER LAPAROSCOPY      age 36    SHOULDER SURGERY       right X 1 2005 left X 2 2006 & unkl     TONSILLECTOMY      as a child, 5 y/o     WRIST SURGERY Right        OB History        0    Para   0    Term   0       0    AB   0    Living   0       SAB   0    IAB   0    Ectopic   0    Multiple   0    Live Births                       ROS:  GENERAL: Feeling well overall.   SKIN: Denies rash or lesions.   HEAD: Denies head injury or headache.   NODES: Denies enlarged lymph nodes.   CHEST: Denies chest pain or shortness of breath.   CARDIOVASCULAR: Denies palpitations or left sided chest pain.   ABDOMEN: No abdominal pain, nausea, vomiting or rectal bleeding.   URINARY: No dysuria, hematuria, or burning on urination.  REPRODUCTIVE: See HPI.   BREASTS: Denies pain, lumps, or nipple discharge.   HEMATOLOGIC: No easy bruisability or excessive bleeding.   MUSCULOSKELETAL: Denies joint pain or swelling.   NEUROLOGIC: Denies syncope or weakness.   PSYCHIATRIC: Denies depression.    PE:   APPEARANCE: Well nourished, well developed, in no acute distress.  NECK: Neck symmetric without masses or thyromegaly.  BREASTS: Symmetrical, no skin changes or visible lesions. No palpable masses,  nipple discharge or adenopathy bilaterally.  ABDOMEN: Soft. No tenderness or masses. No hernias. No CVA tenderness.  VULVA: No lesions. Normal female genitalia.  URETHRAL MEATUS: Normal size and location, no lesions, no prolapse.  URETHRA: No masses, tenderness, prolapse or scarring.  VAGINA: Atrophic.  No lesions, no discharge, no significant cystocele or rectocele.  CERVIX: No lesions and discharge.  UTERUS: Normal size, regular shape, mobile, non-tender, bladder base nontender.  ADNEXA: No masses, tenderness or CDS nodularity.  ANUS PERINEUM: Normal.    Diagnosis:  1. Encounter for gynecological examination with abnormal finding    2. Vaginal atrophy    3. Screening mammogram, encounter for      PLAN:    Orders Placed This Encounter    Mammo Digital Screening Bilat w/ [facility]     Patient was counseled today on postmenopausal issues.    Patient counseled on vaginal atrophy treatment options.  Patient declines today.    Screening mammogram ordered today.    Patient counseled to contact her primary care provider for other screening recommendations including colonoscopy and DEXA screening.    Follow up in about 2 years (around 1/4/2024) for Medicare breast and pelvic exam.     Maynor Givens IV, MD

## 2022-03-02 ENCOUNTER — PES CALL (OUTPATIENT)
Dept: ADMINISTRATIVE | Facility: CLINIC | Age: 77
End: 2022-03-02
Payer: MEDICARE

## 2022-07-08 ENCOUNTER — HOSPITAL ENCOUNTER (OUTPATIENT)
Dept: RADIOLOGY | Facility: OTHER | Age: 77
Discharge: HOME | End: 2022-07-08
Attending: INTERNAL MEDICINE
Payer: MEDICARE

## 2022-07-08 DIAGNOSIS — R10.11 RIGHT UPPER QUADRANT ABDOMINAL PAIN: ICD-10-CM

## 2022-07-08 PROCEDURE — 76705 ECHO EXAM OF ABDOMEN: CPT | Mod: 26,,, | Performed by: RADIOLOGY

## 2022-07-08 PROCEDURE — 76705 US ABDOMEN LIMITED: ICD-10-PCS | Mod: 26,,, | Performed by: RADIOLOGY

## 2022-07-08 PROCEDURE — 76705 ECHO EXAM OF ABDOMEN: CPT | Mod: TC

## 2023-01-20 ENCOUNTER — TELEPHONE (OUTPATIENT)
Dept: OBSTETRICS AND GYNECOLOGY | Facility: CLINIC | Age: 78
End: 2023-01-20
Payer: MEDICARE

## 2023-01-20 DIAGNOSIS — Z12.31 SCREENING MAMMOGRAM, ENCOUNTER FOR: Primary | ICD-10-CM

## 2023-01-20 NOTE — TELEPHONE ENCOUNTER
----- Message from Marlene Mosqueda sent at 1/20/2023 10:58 AM CST -----  Contact: TERRY FLORIAN [[id number]]  Type:  Mammogram    Caller is requesting to schedule their annual mammogram appointment.  Order is not listed in EPIC. Please enter order and contact patient to schedule.        Name of Caller:  TERRY FLORIAN [[id number]]      Where would they like the mammogram performed?  [facility]      Would the patient rather a call back or a response via [facility]? Call back       Best Call Back Number:  [phone] (home)       Additional Information:

## 2023-03-07 ENCOUNTER — HOSPITAL ENCOUNTER (OUTPATIENT)
Dept: RADIOLOGY | Facility: OTHER | Age: 78
Discharge: HOME | End: 2023-03-07
Attending: OBSTETRICS & GYNECOLOGY
Payer: MEDICARE

## 2023-03-07 DIAGNOSIS — Z12.31 SCREENING MAMMOGRAM, ENCOUNTER FOR: ICD-10-CM

## 2023-03-07 PROCEDURE — 77063 MAMMO DIGITAL SCREENING BILAT WITH TOMO: ICD-10-PCS | Mod: 26,,, | Performed by: RADIOLOGY

## 2023-03-07 PROCEDURE — 77063 BREAST TOMOSYNTHESIS BI: CPT | Mod: 26,,, | Performed by: RADIOLOGY

## 2023-03-07 PROCEDURE — 77067 SCR MAMMO BI INCL CAD: CPT | Mod: 26,,, | Performed by: RADIOLOGY

## 2023-03-07 PROCEDURE — 77067 MAMMO DIGITAL SCREENING BILAT WITH TOMO: ICD-10-PCS | Mod: 26,,, | Performed by: RADIOLOGY

## 2023-03-07 PROCEDURE — 77067 SCR MAMMO BI INCL CAD: CPT | Mod: TC

## 2023-03-30 ENCOUNTER — OFFICE VISIT (OUTPATIENT)
Dept: OBSTETRICS AND GYNECOLOGY | Facility: CLINIC | Age: 78
End: 2023-03-30
Payer: MEDICARE

## 2023-03-30 VITALS
HEIGHT: 66 IN | DIASTOLIC BLOOD PRESSURE: 82 MMHG | WEIGHT: 151.25 LBS | SYSTOLIC BLOOD PRESSURE: 142 MMHG | BODY MASS INDEX: 24.31 KG/M2

## 2023-03-30 DIAGNOSIS — R19.00: Primary | ICD-10-CM

## 2023-03-30 DIAGNOSIS — Z12.11 COLON CANCER SCREENING: ICD-10-CM

## 2023-03-30 DIAGNOSIS — N95.2 VAGINAL ATROPHY: ICD-10-CM

## 2023-03-30 PROCEDURE — 99214 OFFICE O/P EST MOD 30 MIN: CPT | Mod: S$PBB,,, | Performed by: OBSTETRICS & GYNECOLOGY

## 2023-03-30 PROCEDURE — 99999 PR PBB SHADOW E&M-EST. PATIENT-LVL III: ICD-10-PCS | Mod: PBBFAC,,, | Performed by: OBSTETRICS & GYNECOLOGY

## 2023-03-30 PROCEDURE — 99214 PR OFFICE/OUTPT VISIT, EST, LEVL IV, 30-39 MIN: ICD-10-PCS | Mod: S$PBB,,, | Performed by: OBSTETRICS & GYNECOLOGY

## 2023-03-30 PROCEDURE — 99999 PR PBB SHADOW E&M-EST. PATIENT-LVL III: CPT | Mod: PBBFAC,,, | Performed by: OBSTETRICS & GYNECOLOGY

## 2023-03-30 PROCEDURE — 99213 OFFICE O/P EST LOW 20 MIN: CPT | Mod: PBBFAC | Performed by: OBSTETRICS & GYNECOLOGY

## 2023-03-30 RX ORDER — DEXTROAMPHETAMINE SACCHARATE, AMPHETAMINE ASPARTATE, DEXTROAMPHETAMINE SULFATE AND AMPHETAMINE SULFATE 2.5; 2.5; 2.5; 2.5 MG/1; MG/1; MG/1; MG/1
1 TABLET ORAL 2 TIMES DAILY
COMMUNITY
Start: 2023-02-24 | End: 2023-05-15 | Stop reason: SDUPTHER

## 2023-03-30 NOTE — PROGRESS NOTES
CC:  Pelvic swelling    HPI:  Laura Smith  is a 78 y.o.  patient who who presents today with complaint of pelvic swelling/abdominal swelling.  Patient reports no change in bowel habits.  Mild occasional incontinence reported.  No pelvic discomfort or early satiety reported.  Patient reports no significant changes in the abdominal area but states she is noted subtle changes/growth of mid section and pelvis over the years.    No other gynecologic complaints today.  Patient reports no vaginal bleeding.    No LMP recorded. Patient is postmenopausal.    Past Medical History:   Diagnosis Date    Allergy     Arthritis     Depression     Lumbar disc disease     PONV (postoperative nausea and vomiting)     Thyroid disease        Past Surgical History:   Procedure Laterality Date    DILATION AND CURETTAGE OF UTERUS  2016    FRACTURE SURGERY      Right wrist, long ago     HYSTEROSCOPY  2016    JOINT REPLACEMENT      right TKA    KNEE SURGERY Right     X 2 2014 & 2015    LASER LAPAROSCOPY      age 36    SHOULDER SURGERY       right X 1 2005 left X 2 2006 & unkl     TONSILLECTOMY      as a child, 5 y/o     WRIST SURGERY Right          ROS:  GENERAL: Feeling well overall.   SKIN: Denies rash or lesions.   HEAD: Denies head injury or headache.   NODES: Denies enlarged lymph nodes.   CHEST: Denies chest pain or shortness of breath.   CARDIOVASCULAR: Denies palpitations or left sided chest pain.   ABDOMEN: No abdominal pain, nausea, vomiting or rectal bleeding.   URINARY: No dysuria, hematuria, or burning on urination.  REPRODUCTIVE: See HPI.   BREASTS: Denies pain, lumps, or nipple discharge.   HEMATOLOGIC: No easy bruisability or excessive bleeding.   MUSCULOSKELETAL: Denies joint pain or swelling.   NEUROLOGIC: Denies syncope or weakness.   PSYCHIATRIC: Denies depression.    PE:   APPEARANCE: Well nourished, well developed, in no acute distress.  BREAST:  Symmetrical with no visible lesions or skin  changes noted.  No palpable mass, nipple discharge or adenopathy noted bilaterally.  ABDOMEN: Soft. No tenderness or masses noted to palpation.  No evidence of ascites.  No hernias. No CVA tenderness.  No peritoneal signs.  VULVA: No lesions. Normal female genitalia.  URETHRAL MEATUS: Normal size and location, no lesions, no prolapse.  URETHRA: No masses, tenderness, prolapse or scarring.  VAGINA:  Atrophic.  No discharge, no significant cystocele or rectocele.  CERVIX: No lesions and discharge.   UTERUS: Normal size, regular shape, mobile, non-tender, bladder base nontender.  ADNEXA: No masses, tenderness or CDS nodularity.  ANUS PERINEUM: Normal.    Diagnosis:  1. Pelvic swelling, midline    2. Vaginal atrophy    3. Colon cancer screening      Orders Placed This Encounter    Cologuard Screening (Multitarget Stool DNA)        Plan:    Benign bimanual exam findings reviewed with patient today.  No pelvic mass identified on bimanual exam.  Patient instructed to monitor for pelvic discomfort/worsening bloating/swelling.  If this occurs, patient instructed to contact office that pelvic sonogram will be ordered.  Patient verbalized understanding of this discussion and recommendation and agreed with this plan.    Patient was counseled today on ovarian cancer signs and symptoms.  Patient verbalized understanding of this discussion.    Patient instructed to contact the office for any changes noted in the pelvic/abdominal area.  Patient verbalized understanding of these recommendations.    Colon cancer screening last performed with Cologuard in 2018.  Cologuard ordered today.  Patient will let Dr. Malcolm Lovell (PCP) know that we have placed disorder.    Follow-up:  1 year for Medicare breast and pelvic exam.  PRN for any changes or new symptoms in the abdominal/pelvic area.    Maynor Givens IV, MD

## 2023-04-25 LAB — NONINV COLON CA DNA+OCC BLD SCRN STL QL: POSITIVE

## 2023-05-18 ENCOUNTER — TELEPHONE (OUTPATIENT)
Dept: OBSTETRICS AND GYNECOLOGY | Facility: CLINIC | Age: 78
End: 2023-05-18
Payer: MEDICARE

## 2023-05-18 NOTE — TELEPHONE ENCOUNTER
Patient attempted to be contacted via telephone today.  Patient did not answer but message with left on her voicemail to contact office tomorrow (05/19/2023).    Maynor Givens IV, MD

## 2023-05-19 ENCOUNTER — TELEPHONE (OUTPATIENT)
Dept: OBSTETRICS AND GYNECOLOGY | Facility: CLINIC | Age: 78
End: 2023-05-19
Payer: MEDICARE

## 2023-05-19 NOTE — TELEPHONE ENCOUNTER
Called pt. She had colonoscopy done a week after + cologuard test. Colonoscopy negative. Results in media tab    Will inform Dr. Givens

## 2023-05-19 NOTE — TELEPHONE ENCOUNTER
----- Message from Eve James MA sent at 5/19/2023  8:35 AM CDT -----  Regarding: Return Call  Message Type: Return Call           Who Called: TERRY FLORIAN [[id number]]              Who Left Message for Patient: marissa              Does the patient know what this is regarding? yes              Best Call Back Number: [phone]              Additional Information: Patient is returning a call.  Please assist.

## 2023-08-03 ENCOUNTER — HOSPITAL ENCOUNTER (OUTPATIENT)
Dept: RADIOLOGY | Facility: OTHER | Age: 78
Discharge: HOME | End: 2023-08-03
Attending: INTERNAL MEDICINE
Payer: MEDICARE

## 2023-08-03 DIAGNOSIS — Z87.891 EX-CIGARETTE SMOKER: ICD-10-CM

## 2023-08-03 PROCEDURE — 71271 CT THORAX LUNG CANCER SCR C-: CPT | Mod: TC

## 2023-08-03 PROCEDURE — 71271 CT THORAX LUNG CANCER SCR C-: CPT | Mod: 26,,, | Performed by: RADIOLOGY

## 2023-08-03 PROCEDURE — 71271 CT CHEST LUNG SCREENING LOW DOSE: ICD-10-PCS | Mod: 26,,, | Performed by: RADIOLOGY

## 2023-09-01 ENCOUNTER — HOSPITAL ENCOUNTER (OUTPATIENT)
Dept: RADIOLOGY | Facility: OTHER | Age: 78
Discharge: HOME | End: 2023-09-01
Attending: INTERNAL MEDICINE
Payer: MEDICARE

## 2023-09-01 DIAGNOSIS — R10.11 RUQ PAIN: ICD-10-CM

## 2023-09-01 PROCEDURE — 76700 US ABDOMEN COMPLETE: ICD-10-PCS | Mod: 26,,, | Performed by: RADIOLOGY

## 2023-09-01 PROCEDURE — 76700 US EXAM ABDOM COMPLETE: CPT | Mod: TC

## 2023-09-01 PROCEDURE — 76700 US EXAM ABDOM COMPLETE: CPT | Mod: 26,,, | Performed by: RADIOLOGY

## 2023-09-07 NOTE — TELEPHONE ENCOUNTER
----- Message from Alisha Desai sent at 11/22/2017  9:12 AM CST -----  Contact: pt  x_  1st Request  _  2nd Request  _  3rd Request      Who:pt    Why:  Pt would like to speak to staff in regards to vaginal irration     What Number to Call Back: [phone] or [phone]    When to Expect a call back: (Before the end of the day)   -- if call after 3:00 call back will be tomorrow.     PAIN

## 2023-09-08 ENCOUNTER — TELEPHONE (OUTPATIENT)
Dept: REHABILITATION | Facility: OTHER | Age: 78
End: 2023-09-08
Payer: MEDICARE

## 2023-09-11 ENCOUNTER — CLINICAL SUPPORT (OUTPATIENT)
Dept: REHABILITATION | Facility: OTHER | Age: 78
End: 2023-09-11
Payer: MEDICARE

## 2023-09-11 ENCOUNTER — CLINICAL SUPPORT (OUTPATIENT)
Dept: REHABILITATION | Facility: OTHER | Age: 78
End: 2023-09-11
Attending: PHYSICAL MEDICINE & REHABILITATION
Payer: MEDICARE

## 2023-09-11 VITALS
WEIGHT: 151.25 LBS | HEIGHT: 64 IN | DIASTOLIC BLOOD PRESSURE: 70 MMHG | HEART RATE: 74 BPM | SYSTOLIC BLOOD PRESSURE: 124 MMHG | BODY MASS INDEX: 25.82 KG/M2

## 2023-09-11 DIAGNOSIS — M41.00 INFANTILE IDIOPATHIC SCOLIOSIS, UNSPECIFIED SPINAL REGION: ICD-10-CM

## 2023-09-11 DIAGNOSIS — Z76.89 SEEN BY HEALTH AND WELLBEING COACH: Primary | ICD-10-CM

## 2023-09-11 DIAGNOSIS — M51.36 DDD (DEGENERATIVE DISC DISEASE), LUMBAR: ICD-10-CM

## 2023-09-11 DIAGNOSIS — M47.816 SPONDYLOSIS OF LUMBAR REGION WITHOUT MYELOPATHY OR RADICULOPATHY: Primary | ICD-10-CM

## 2023-09-11 NOTE — PROGRESS NOTES
Subjective:     Patient ID: Laura Smith is a 78 y.o. female.    Chief Complaint: No chief complaint on file.    Ms Smith is a 77 yo female sent in consultation by Dr. Lovell for evaluation for the healthy back lumbar program.  she has had low back pain for at least 10 years.  She does not remember anything that started it.  It has been worst the past couple of year.  The pain is midline low back dominant and goes to both sides.  The pain is a dull pain.  She has tingling across the low back sometimes.  There is no burning numbness or weakness.  The pain is intermittent 0-9/10.  It is worse with bending, standing, and afternoon.  It is better walking, sitting, lying on back and stomach, climbing stairs, morning, evening and nighttime.  She did PT in 2016 and 2021 with no relief.  She did not do HEP.  She was seeing chiropractor earlier this year, but stopped because no relief.  She had injections with some relief.  She has not had surgery.  Her goals are household chores, bending, and standing.  She has had 2 falls where she had hands full and could not hold on to railing.  Pattern 1    MRI lumbar 2021  There is reversal of the normal cervical lordosis. There is 4 mm of anterolisthesis of L1 on L2. There is 4 mm of retrolisthesis of L3 on L4. There is multilevel Modic change. There is S-shaped lumbar scoliosis. The cord terminates at the level of L1.     T12-L1: There is a small circumferential broad-based disc bulge and bilateral facet arthropathy resulting in mild canal and foraminal stenosis bilaterally.     L1-L2:  There is a moderately sized circumference of broad-based disc bulge with facet arthropathy and ligament of flavum hypertrophy. This results in moderate narrowing of the central canal and left neural foramen. There is severe narrowing of the right neural foramen.     L2-L3:  There is a moderately sized circumferential broad-based disc bulge with ligament of flavum hypertrophy and facet  arthropathy. This results in moderate narrowing of the bilateral neural foramina with severe bilateral neural foraminal narrowing.     L3-L4:  There is a moderately sized circumferential broad-based disc bulge with severe bilateral facet arthropathy and ligament of flavum hypertrophy. There is a small amount of fluid within the right facet joint. This results in severe narrowing of the left lateral recess and left neural foramen. There is moderate narrowing of the central canal and right neuroforamina.     L4-L5:  There is a moderately sized uncovertebral broad-based disc bulge with severe bilateral facet arthropathy and ligament of flavum hypertrophy. There is asymmetric ligament hypertrophy and facet arthropathy on the left. There is severe narrowing of the left neural foramen and left lateral recess and there is moderate narrowing of the central canal and right neural foramen.     L5-S1:  There is a small circumferential broad-based disc bulge. There is severe bilateral facet arthropathy. The central canal remains widely patent. There is mild narrowing of the left neural foramen with moderate to severe narrowing of the right neural foramen.     The paraspinal musculature is atrophic. There is no retroperitoneal adenopathy. The aorta is nonaneurysmal.    Past Medical History:  No date: Allergy  No date: Arthritis  No date: Depression  No date: Lumbar disc disease  No date: PONV (postoperative nausea and vomiting)  No date: Thyroid disease    Past Surgical History:  07/11/2016: DILATION AND CURETTAGE OF UTERUS  No date: FRACTURE SURGERY      Comment:  Right wrist, long ago   07/11/2016: HYSTEROSCOPY  No date: JOINT REPLACEMENT      Comment:  right TKA  No date: KNEE SURGERY; Right      Comment:  X 2 2014 & 2015  No date: LASER LAPAROSCOPY      Comment:  age 36  No date: SHOULDER SURGERY      Comment:   right X 1 2005 left X 2 2006 & unkl   No date: TONSILLECTOMY      Comment:  as a child, 5 y/o   No date: WRIST  SURGERY; Right    Review of patient's family history indicates:  Adopted:  Yes  Problem: Breast cancer      Relation: Neg Hx          Age of Onset: (Not Specified)  Problem: Colon cancer      Relation: Neg Hx          Age of Onset: (Not Specified)  Problem: Ovarian cancer      Relation: Neg Hx          Age of Onset: (Not Specified)      Social History    Socioeconomic History      Marital status:     Tobacco Use      Smoking status: Former      Smokeless tobacco: Never    Substance and Sexual Activity      Alcohol use: Yes        Comment: pony beer daily, martini weekend       Drug use: No      Sexual activity: Not Currently        Partners: Male        Comment:  Unruly      Current Outpatient Medications:  aripiprazole (ABILIFY) 2 MG Tab, , Disp: , Rfl:   dextroamphetamine-amphetamine (ADDERALL) 20 mg tablet, Take 1 tablet by mouth Daily., Disp: , Rfl:   dextroamphetamine-amphetamine 10 mg Tab, Take 1 tablet (10 mg total) by mouth 2 (two) times daily., Disp: 60 tablet, Rfl: 0  EScitalopram oxalate (LEXAPRO) 20 MG tablet, Take 1 tablet (20 mg total) by mouth once daily., Disp: 90 tablet, Rfl: 3  ferrous sulfate (FEOSOL) Tab tablet, Take 1 tablet by mouth once daily., Disp: , Rfl:   levothyroxine (EUTHYROX) 75 MCG tablet, Take 1 tablet by mouth once daily, Disp: 90 tablet, Rfl: 3  LORazepam (ATIVAN) 1 MG tablet, TAKE 1 TABLET BY MOUTH TWICE DAILY, Disp: 60 tablet, Rfl: 1  magnesium 30 mg Tab, Take by mouth once., Disp: , Rfl:   MULTIVITAMIN W-MINERALS/LUTEIN (CENTRUM SILVER ORAL), Take 1 tablet by mouth once daily., Disp: , Rfl:   pantoprazole (PROTONIX) 40 MG tablet, Take 1 tablet (40 mg total) by mouth 2 (two) times daily., Disp: 60 tablet, Rfl: 2  semaglutide (OZEMPIC) 0.25 mg or 0.5 mg (2 mg/3 mL) pen injector, Inject .25 mg weekly x 4 weeks.  Then increase to .5mg weekly for 4 weeks.  Then call physician for new dosage., Disp: 2 each, Rfl: 0    No current facility-administered medications for this  visit.      Review of patient's allergies indicates:   -- Demerol [meperidine] -- Nausea And Vomiting          Review of Systems   Constitutional: Negative for weight gain and weight loss.   Cardiovascular:  Negative for chest pain.   Respiratory:  Negative for shortness of breath.    Musculoskeletal:  Positive for back pain. Negative for joint pain and joint swelling.   Gastrointestinal:  Positive for heartburn. Negative for abdominal pain, bowel incontinence, nausea and vomiting.   Genitourinary:  Negative for bladder incontinence.   Neurological:  Positive for paresthesias (across back). Negative for numbness.        Objective:     General: Laura is well-developed, well-nourished, appears stated age, in no acute distress, alert and oriented to time, place and person.     General    Vitals reviewed.  Constitutional: She is oriented to person, place, and time. She appears well-developed and well-nourished.   HENT:   Head: Normocephalic and atraumatic.   Pulmonary/Chest: Effort normal.   Neurological: She is alert and oriented to person, place, and time.   Psychiatric: She has a normal mood and affect. Her behavior is normal. Judgment and thought content normal.     General Musculoskeletal Exam   Gait: normal     Right Ankle/Foot Exam     Tests   Heel Walk: able to perform  Tiptoe Walk: able to perform    Left Ankle/Foot Exam     Tests   Heel Walk: able to perform  Tiptoe Walk: able to perform      Right Hip Exam     Tenderness  Also right side trochanteric tenderness.  Left Hip Exam     Tenderness  Also left side trochanteric tenderness.      Back (L-Spine & T-Spine) / Neck (C-Spine) Exam     Tenderness   The patient is tender to palpation of the right side trochanteric and left side trochanteric. Right paramedian tenderness of the Lower L-Spine. Left paramedian tenderness of the Lower L-Spine.     Back (L-Spine & T-Spine) Range of Motion   Extension:  10   Flexion:  80     Spinal Sensation   Right Side  Sensation  C-Spine Level: normal   L-Spine Level: normal  S-Spine Level: normal  Left Side Sensation  C-Spine Level: normal  L-Spine Level: normal  S-Spine Level: normal    Back (L-Spine & T-Spine) Tests   Right Side Tests  Straight leg raise:        Sitting SLR: > 70 degrees    Left Side Tests  Straight leg raise:       Sitting SLR: > 70 degrees      Other   She has scoliosis of the left back.  Spinal Kyphosis:  Absent    Comments:  Neg FANTA      Muscle Strength   Right Upper Extremity   Biceps: 5/5   Deltoid:  5/5  Triceps:  5/5  Wrist extension: 5/5   Finger Flexors:  5/5  Left Upper Extremity  Biceps: 5/5   Deltoid:  5/5  Triceps:  5/5  Wrist extension: 5/5   Finger Flexors:  5/5  Right Lower Extremity   Hip Flexion: 5/5   Quadriceps:  5/5   Anterior tibial:  5/5   EHL:  5/5  Left Lower Extremity   Hip Flexion: 5/5   Quadriceps:  5/5   Anterior tibial:  5/5   EHL:  5/5    Reflexes     Left Side  Biceps:  2+  Triceps:  2+  Brachioradialis:  2+  Achilles:  2+  Left Worrell's Sign:  Absent  Babinski Sign:  absent  Quadriceps:  2+    Right Side   Biceps:  2+  Triceps:  2+  Brachioradialis:  2+  Achilles:  2+  Right Worrell's Sign:  absent  Babinski Sign:  absent  Quadriceps:  2+    Vascular Exam     Right Pulses        Carotid:                  2+    Left Pulses        Carotid:                  2+          Assessment:     1. Spondylosis of lumbar region without myelopathy or radiculopathy    2. Infantile idiopathic scoliosis, unspecified spinal region    3. DDD (degenerative disc disease), lumbar         Plan:        The patient has had a history of low back pain with limitations in there activities of Daily living    Previous treatment has not provided relief.    The situation was discussed at length with the patient.  More than 50% of the total time of 45 minutes was spent in counseling.  We discussed different causes of back pain and different treatment options.  We discussed the importance of stretching and  strengthening.  We discussed posture.  We discussed breaks from bending forward when doing household chores.  We also discussed not carrying too much when going up the stairs. We discussed the pros and cons of further diagnostic testing, alternative treatment and Medications    Based on the history, physical exam, and functional index, an active physical therapy program is recommended.  The goal is to restore the patients function and reduce pain.  A program of progressive resistance exercises, biomechanical, and mobility maneuvers, instructions in proper body mechanics, aerobic conditioning and HEP will be utilized. The program will continue as long as making improvements.    An assessment of patients progress will be made at each visit to document change in status.    The patient will be actively involved in their own treatment, and responsible for appointments and home program    The patient's lumbar isometric strength will be tested and they will be placed in a program of isolated strength training based on 50% of their total functional torque and advanced as clinically appropriate.      Directional preference of pain will further influence the patients active rehabilitation program    The patient was instructed there might be an initial increase in discomfort    They are enrolled with good prognosis  Pattern 1        Follow-up: No follow-ups on file. If there are any questions prior to this, the patient was instructed to contact the office.

## 2023-09-11 NOTE — PROGRESS NOTES
Health  met with patient to complete initial outcomes for the Healthy Back lumbar program.  Questions were reviewed with patient and discussed with Dr. Gandhi. The patient will meet with Dr. Gandhi to determine program enrollment.         9/11/2023     1:18 PM   HealthyBack Questionnaire    Location of your worst pain: Lower Back   Please select the location of any additional pain: (hold down the control key, and click to select multiple responses) None of the above    Did the pain begin after an event, injury, or accident? No   How long has this pain been going on?  10 years   Please indicate how the pain is changing.  Worsening   What is the WORST level of pain that you have experienced in the last week?  9   What is the LEAST level of pain that you have experienced in the past week?  0   What can you NOT do not that you used to be able to do?   heavy packages   Are your limitations mainly due to your pain?  No   What are your additional complaints, if any? Tingling   Is there ever a time during the day when your pain stops, even for a brief moment, before returning? Yes   If yes, when your pain stops, does it disappear completely? Is it totally gone? Yes   Does bending forward make your typical pain worse? Yes   Morning: Better during   Afternoon: Worse during   Evening:  Better during   Nighttime: Better during   Standing:  Worse   Lying on stomach: Better   Lying on back: Better   Sitting:  Better   Walking: Better   Climbing stairs: Better   Emergency department  No   Health care providers (hold down the control key, and click to select multiple responses) Family doctor    Chiropractor    Physical therapist   Investigations done (hold down the control key, and click to select multiple responses) MRI   Procedures (hold down the control key, and click to select multiple responses) Epidural steroid injections ([facility])   Have you had any surgery on your back?  No   Please delfino what you are experiencing.  (hold down the control key, and click to select multiple responses) Arthritic joint pain   First activity you would like to perform better:  household chores   Second activity you would like to perform better: standing   Third activity you would like to perform better: bending   What is your occupation?  n/a   What is your highest level of education? Advanced/graduate   What is your work status? Retired   How did you hear about the Healthyback program?  Physician

## 2023-09-15 ENCOUNTER — CLINICAL SUPPORT (OUTPATIENT)
Dept: REHABILITATION | Facility: OTHER | Age: 78
End: 2023-09-15
Attending: PHYSICAL MEDICINE & REHABILITATION
Payer: MEDICARE

## 2023-09-15 DIAGNOSIS — M47.816 SPONDYLOSIS OF LUMBAR REGION WITHOUT MYELOPATHY OR RADICULOPATHY: ICD-10-CM

## 2023-09-15 DIAGNOSIS — M51.36 DDD (DEGENERATIVE DISC DISEASE), LUMBAR: ICD-10-CM

## 2023-09-15 DIAGNOSIS — R29.898 DECREASED STRENGTH OF TRUNK AND BACK: ICD-10-CM

## 2023-09-15 DIAGNOSIS — M25.69 DECREASED RANGE OF MOTION OF TRUNK AND BACK: ICD-10-CM

## 2023-09-15 DIAGNOSIS — M41.00 INFANTILE IDIOPATHIC SCOLIOSIS, UNSPECIFIED SPINAL REGION: ICD-10-CM

## 2023-09-15 PROCEDURE — 97112 NEUROMUSCULAR REEDUCATION: CPT

## 2023-09-15 PROCEDURE — 97530 THERAPEUTIC ACTIVITIES: CPT

## 2023-09-15 PROCEDURE — 97110 THERAPEUTIC EXERCISES: CPT

## 2023-09-15 PROCEDURE — 97161 PT EVAL LOW COMPLEX 20 MIN: CPT

## 2023-09-15 NOTE — PLAN OF CARE
"[facility]  Physical Therapy Lumbar Evaluation      Name: Laura Spann [facility] Number: [id number]    Therapy Diagnosis:   Encounter Diagnoses   Name Primary?    Infantile idiopathic scoliosis, unspecified spinal region     Spondylosis of lumbar region without myelopathy or radiculopathy     DDD (degenerative disc disease), lumbar     Decreased range of motion of trunk and back     Decreased strength of trunk and back      Physician: Analisa Gandhi, *    Physician Orders: PT Eval and Treat  Medical Diagnosis from Referral:   M41.00 (ICD-10-CM) - Infantile idiopathic scoliosis, unspecified spinal region   M47.816 (ICD-10-CM) - Spondylosis of lumbar region without myelopathy or radiculopathy   M51.36 (ICD-10-CM) - DDD (degenerative disc disease), lumbar     Evaluation Date: 9/15/2023  Authorization Period Expiration: 9/10/2024  Plan of Care Expiration: 11/24/2023  Reassessment Due: 10/15/2023  Visit # / Visits authorized: 1/1 (pending)    Time In: 10:10 am  Time Out: 11:30 am  Total Billable Time: 80 minutes  INSURANCE and OUTCOMES: Fee for Service with FOTO Outcomes 1/3    Precautions: standard and fall    Pattern of pain determined: 1 / movement responder    Subjective     Date of onset: 10+ years  History of current condition: Grace reports she has pain in the "saddle" of her low back across the back. It varies in intensity, there are good days and bad days. She describes it as sore, and sometimes it tingles into the the bilateral buttocks but never down the legs. The pain is equal on both sides. She reports she has scoliosis that appeared within the last few years. She recently had 2 falls going up and down the stairs while carrying stuff, but she attributes this to poor balance and knee pain. Aggravating factors include doing laundry, bending over, standing, doing dishes, playing tennis, lifting, carrying, deep breathing. Alleviated with sitting, lying " flat on back, and injections.    Per MD:  Ms Smith is a 77 yo female sent in consultation by Dr. Lovell for evaluation for the healthy back lumbar program.  she has had low back pain for at least 10 years.  She does not remember anything that started it.  It has been worst the past couple of year.  The pain is midline low back dominant and goes to both sides.  The pain is a dull pain.  She has tingling across the low back sometimes.  There is no burning numbness or weakness.  The pain is intermittent 0-9/10.  It is worse with bending, standing, and afternoon.  It is better walking, sitting, lying on back and stomach, climbing stairs, morning, evening and nighttime.  She did PT in 2016 and 2021 with no relief.  She did not do HEP.  She was seeing chiropractor earlier this year, but stopped because no relief.  She had injections with some relief.  She has not had surgery.  Her goals are household chores, bending, and standing.  She has had 2 falls where she had hands full and could not hold on to railing.  Pattern 1     Medical History:   Past Medical History:   Diagnosis Date    Allergy     Arthritis     Depression     Lumbar disc disease     PONV (postoperative nausea and vomiting)     Thyroid disease        Surgical History:   Laura Smith  has a past surgical history that includes Knee surgery (Right); Shoulder surgery; Laser laparoscopy; Fracture surgery; Tonsillectomy; Joint replacement; Wrist surgery (Right); Dilation and curettage of uterus (07/11/2016); and Hysteroscopy (07/11/2016).    Medications:   Laura has a current medication list which includes the following prescription(s): aripiprazole, dextroamphetamine-amphetamine, escitalopram oxalate, ferrous sulfate, levothyroxine, lorazepam, magnesium, folic acid/multivit-min/lutein, pantoprazole, and ozempic.    Allergies:   Review of patient's allergies indicates:   Allergen Reactions    Demerol [meperidine] Nausea And Vomiting        Imaging:  MRI OF THE LUMBAR SPINE WITHOUT CONTRAST     INDICATION: Other intervertebral disc degeneration, lumbar region;Low back pain without sciatica, unspecified back pain laterality, unspecified chronicity;Radiculopathy, lumbar region;Other forms of scoliosis, thoracolumbar region     COMPARISON: MRI of the lumbar spine 11/23/2015.     TECHNIQUE: This study was performed on the Siemens 1.5 Crystal high field MR unit. Multislice, multisequence images of the lumbar spine were obtained in multiple projections without the use of I.V. contrast.     FINDINGS:     There is reversal of the normal cervical lordosis. There is 4 mm of anterolisthesis of L1 on L2. There is 4 mm of retrolisthesis of L3 on L4. There is multilevel Modic change. There is S-shaped lumbar scoliosis. The cord terminates at the level of L1.     T12-L1: There is a small circumferential broad-based disc bulge and bilateral facet arthropathy resulting in mild canal and foraminal stenosis bilaterally.     L1-L2:  There is a moderately sized circumference of broad-based disc bulge with facet arthropathy and ligament of flavum hypertrophy. This results in moderate narrowing of the central canal and left neural foramen. There is severe narrowing of the right neural foramen.     L2-L3:  There is a moderately sized circumferential broad-based disc bulge with ligament of flavum hypertrophy and facet arthropathy. This results in moderate narrowing of the bilateral neural foramina with severe bilateral neural foraminal narrowing.     L3-L4:  There is a moderately sized circumferential broad-based disc bulge with severe bilateral facet arthropathy and ligament of flavum hypertrophy. There is a small amount of fluid within the right facet joint. This results in severe narrowing of the left lateral recess and left neural foramen. There is moderate narrowing of the central canal and right neuroforamina.     L4-L5:  There is a moderately sized uncovertebral broad-based disc  "bulge with severe bilateral facet arthropathy and ligament of flavum hypertrophy. There is asymmetric ligament hypertrophy and facet arthropathy on the left. There is severe narrowing of the left neural foramen and left lateral recess and there is moderate narrowing of the central canal and right neural foramen.     L5-S1:  There is a small circumferential broad-based disc bulge. There is severe bilateral facet arthropathy. The central canal remains widely patent. There is mild narrowing of the left neural foramen with moderate to severe narrowing of the right neural foramen.     The paraspinal musculature is atrophic. There is no retroperitoneal adenopathy. The aorta is nonaneurysmal.    Prior Therapy: Yes in 2016, and 2021 with no relief  Prior Treatment: chiropractor with short term relief, 1 injection  Social History: 2 Story home, [address] with railing, lives alone  lives at [place]  Occupation: Retired  Leisure: Volunteer work      Prior Level of Function: independent  Current Level of Function: increased pain and decreased tolerance to doing laundry, bending over, standing, doing dishes, playing tennis, lifting, carrying, deep breathing  DME owned/used: none  Gym Membership: [employer] gym    Pain:  Current 3/10, worst 9/10, best 0/10   Location: bilateral back  and buttocks   Description: Aching and Tingling  Aggravating Factors: doing laundry, bending over, standing, doing dishes, playing tennis, lifting, carrying, deep breathing  Easing Factors: sitting, lying flat on back, and injections  Disturbed Sleep: Yes, but not from back    Pattern of pain questions:  1.  Where is your pain the worst? bilateral  2.  Is your pain constant or intermittent? constant  3.  Does bending forward make your typical pain worse? yes  4.  Since the start of your back pain, has there been a change in your bowel or bladder? no  5.  What can't you do now that you use to be able to do? tennis    Pts goals: "To straighten my " "back and to not have pain"    Red Flag Screening:   Cough/Sneeze Strain: (+)  Bladder/Bowel: (--)  Falls: (+)  Night pain: (--)  Unexplained weight loss: (--)  General health: "Pretty good"    Objective      Postural examination/scapula alignment: Rounded shoulder, Head forward, Slouched posture, Trunk deviated right, Increased kyphosis, and Decreased lordosis  Joint integrity: Hard end feel  Skin integrity:WNL   Edema: None  Correction of posture: better with lumbar roll  Sitting: poor  Standing: poor    MOVEMENT LOSS - Lumbar   Norms ROM Loss Initial   Flexion Fingers touch toes, sacral angle >/= 70 deg, uniform spinal curvature, posterior weight shift  minimal loss   Extension ASIS surpasses toes, spine of scapulae surpasses heels, uniform spinal curve moderate loss   Side glide Right  moderate loss and major loss   Side glide Left  moderate loss   Rotation Right PT observes contralateral shoulder minimal loss   Rotation Left PT observes contralateral shoulder moderate loss     Lower Extremity Strength  Right LE  Left LE    Hip flexion: 4/5 Hip flexion: 4/5   Hip extension:  4-/5 Hip extension: 4-/5   Hip abduction: 4-/5 Hip abduction: 4-/5   Hip adduction:  4-/5 Hip adduction:  4-/5   Hip External Rotation 4/5 Hip External Rotation 4/5   Hip Internal rotation   4/5 Hip Internal rotation 4/5   Knee Flexion 4/5 Knee Flexion 4/5   Knee Extension 4+/5 Knee Extension 4+/5   Ankle dorsiflexion: 5/5 Ankle dorsiflexion: 5/5   Ankle plantarflexion: 4/5 Ankle plantarflexion: 4/5     GAIT:  Assistive Device used: none  Level of Assistance: supervision  Patient displays the following gait deviations: unsteady gait, decreased step length, and increased base of support.     Special Tests:   Test Name  Test Result   Prone Instability Test (--)   SI Joint Provocation Test (+)   Straight Leg Raise (--)   Neural Tension Test (--)   Crossed Straight Leg Raise (--)   Walking on toes Able   Walking on heels  Able     NEUROLOGICAL " "SCREENING:     Sensory deficits: intact to light touch     Reflexes:    Left Right   Patella Tendon 2+ 2+   Achilles Tendon 1+ 1+   Babinski  (--) (--)   Clonus (--) (--)     REPEATED TEST MOVEMENTS:    Baseline symptoms:  Repeated Flexion in Standing worse   Repeated Extension in Standing worse   Repeated Flexion in lying no worse  no better   Repeated Extension in lying  no worse  no better       STATIC TESTS and other movements:   Prone lie no effect   Prone lie on elbows no effect   Sitting slouched  worse   Sitting erect better   Standing slouched worse   Standing erect  better   Lying prone in extension  no effect   Long sitting   no effect   Sustained flexion no effect   Sustained prone using mat no effect       Baseline Isometric Testing on Med X equipment: Testing administered by PT    Date of testin/15/2023  ROM 3-42 deg   Max Peak Torque 107    Min Peak Torque 46    Flex/Ext Ratio 2.3:1   % below normative data 29     OUTCOMES SELECTION:   Intake Outcome Measure for FOTO lumbar Survey    Therapist reviewed FOTO scores for Laura Smith on 9/15/2023.   FOTO documents entered into [employer] - see Media section.    Intake Score: 63% functional ability  Goal Score: 65% functional ability          Treatment     Total Treatment time separate from Evaluation: 45 minutes    Laura received therapeutic exercises to develop/improve posture, lumbar ROM, strength, and muscular endurance for 15 minutes including the following exercises:     EIL 2x10  DKTC 10x5"  PPT 15x5"  SOC 15x5"  LTRs x20    Written Home Exercises Provided: yes.    HEP AS FOLLOWS:  EIL   DKTC   PPT   SOC   LTRs    Exercises were reviewed and Grace was able to demonstrate them prior to the end of the session. Grace demonstrated good  understanding of the education provided.     See EMR under Patient Instructions for exercises provided 9/15/2023.    Laura received neuromuscular education to engage spinal musculature correctly for motor " control and engagement of musculature for 15 minutes including the MedX exercise component and practice and standard testing. MedX dynamic exercise and baseline isometric test performed with instructions to guide the patient safely through the testing procedure. Patient instructed to perform isometric test correctly and safely while building to an optimal force with a pain-free effort. Patient also instructed that she should feel support/pressure from MedX restraints but no pain/discomfort. Patient demonstrated appropriate understanding of information.         9/15/2023    11:23 AM   [facility] Therapy - Short   Visit Number 1   VAS Pain Rating 3   Lumbar Stretches - Slouch 10   Extension in Lying 10   Flexion in Lying 10   Lumbar Extension - Seat Pad 2   Femur Restraint 5   Top Dead Center 24   Counterweight 99   Lumbar Flexion 42   Lumbar Extension 3   Lumbar Peak Torque 107 ft. lbs.   Lumbar Weight 45 lbs   Repetitions 5         Therapeutic Education/Activity provided for 10 minutes:   - Patient was given an [facility] Visit 1 handout which discusses the following:  - what to expect in therapy  - an overview of the program, including health coaching and wellness  - importance of spinal hygiene, proper posture, lifting mechanics, sleep quality, and nutrition/hydration   - Vito roll trialed, recommended, and purchase information was provided.  - Patient received a handout regarding anticipated muscular soreness following the isometric test and strategies for management were reviewed with patient including stretching, using ice and scheduled rest.   - Patient received verbal education on the following:   - Healthy Back program   - purpose of the isometric test  - safe progression of lumbar strengthening, wellness approach, and systemic strengthening.   - safe usage of MedX machine and testing protocols.    Grace received cold pack for 5 minutes to low back in [place].    Assessment     Laura is a 78  y.o. female referred to [facility] with a medical diagnosis of M41.00 (ICD-10-CM) - Infantile idiopathic scoliosis, unspecified spinal region, M47.816 (ICD-10-CM) - Spondylosis of lumbar region without myelopathy or radiculopathy, and M51.36 (ICD-10-CM) - DDD (degenerative disc disease), lumbar.. Pt presents with signs and symptoms consistent with diagnosis including decreased range of motion of lumbar spine, weakness of trunk and back, bilateral lower extremity weakness, altered sensation, decreased joint mobility, decreased soft tissue flexibility and mobility, poor posture, poor balance, and decreased functional mobility tolerance. These deficits are limiting patient in full participation in ADL's and leisure activities such as doing laundry, bending over, standing, doing dishes, playing tennis, lifting, carrying, deep breathing. Pt is 29% below age normative values with medx lumbar isometric strength testing.     Pain Pattern: 1 / movement responder       Pt prognosis is Good.     Pt will benefit from skilled outpatient Physical Therapy to address the deficits stated above and in the chart below, to provide pt/family education, and to maximize pt's level of independence. Based on the above history and physical examination an active physical therapy program is recommended.      Plan of care discussed with patient: Yes  Pt's spiritual, cultural and educational needs considered and patient is agreeable to the plan of care and goals as stated below:     Anticipated Barriers for therapy: scoliosis, chronicity of condition    PT Evaluation Completed? Yes    Medical necessity is demonstrated by the following problem list:    History  Co-morbidities and personal factors that may impact the plan of care [x] LOW: no personal factors / co-morbidities  [] MODERATE: 1-2 personal factors / co-morbidities  [] HIGH: 3+ personal factors / co-morbidities    Moderate / High Support Documentation:   Co-morbidities  affecting plan of care: none    Personal Factors:   age     Examination  Body Structures and Functions, activity limitations and participation restrictions that may impact the plan of care [x] LOW: addressing 1-2 elements  [] MODERATE: 3+ elements  [] HIGH: 4+ elements (please support below)    Moderate / High Support Documentation:     Clinical Presentation [x] LOW: stable  [] MODERATE: Evolving  [] HIGH: Unstable     Decision Making/ Complexity Score: low         GOALS: Pt is in agreement with the following goals.    Short term goals:  6 weeks or 10 visits   - Pt will demonstrate increased lumbar MedX ROM by at least 6 degrees from the initial ROM value with improvements noted in functional ROM and ability to perform ADLs. Appropriate and Ongoing  - Pt will demonstrate increased MedX average isometric strength value by 10% from initial test resulting in improved ability to perform bending, lifting, and carrying activities safely, confidently. Appropriate and Ongoing  - Pt will report a reduction in worst pain score by 1-2 points for improved tolerance for standing. Appropriate and Ongoing  - Pt able to perform HEP correctly with minimal cueing or supervision from therapist to encourage independent management of symptoms. Appropriate and Ongoing    Long term goals: 10 weeks or 20 visits   - Pt will demonstrate increased lumbar MedX ROM by at least 9 degrees from initial ROM value, resulting in improved ability to perform functional forward bending while standing and sitting. Appropriate and Ongoing  - Pt will demonstrate increased MedX average isometric strength value by 20% from initial test resulting in improved ability to perform bending, lifting, and carrying activities safely and confidently. Appropriate and Ongoing  - Pt to demonstrate ability to independently control and reduce their pain through posture positioning and mechanical movements throughout a typical day. Appropriate and Ongoing  - Pt will  demonstrate reduced pain and improved functional outcomes as reported on the FOTO by reaching an intake score of >/= 65% functional ability in order to demonstrate subjective improvement in patient's condition. . Appropriate and Ongoing  - Pt will demonstrate independence with the HEP at discharge. Appropriate and Ongoing  - Pt will be able to stand for 1 hour without increased back pain. Appropriate and Ongoing    Plan     Outpatient physical therapy 2x week for 10 weeks or 20 visits to include the following:   - Patient education  - Therapeutic exercise  - Manual therapy  - Performance testing   - Neuromuscular Re-education  - Therapeutic activity   - Modalities    Pt may be seen by PTA as part of the rehabilitation team.     Therapist: Toney Wild, PT  9/15/2023

## 2024-01-01 ENCOUNTER — ANESTHESIA (OUTPATIENT)
Dept: SURGERY | Facility: OTHER | Age: 79
DRG: 956 | End: 2024-01-01
Payer: MEDICARE

## 2024-01-01 ENCOUNTER — HOSPITAL ENCOUNTER (INPATIENT)
Facility: OTHER | Age: 79
LOS: 1 days | DRG: 956 | End: 2024-07-22
Attending: EMERGENCY MEDICINE | Admitting: INTERNAL MEDICINE
Payer: MEDICARE

## 2024-01-01 ENCOUNTER — ANESTHESIA EVENT (OUTPATIENT)
Dept: SURGERY | Facility: OTHER | Age: 79
DRG: 956 | End: 2024-01-01
Payer: MEDICARE

## 2024-01-01 VITALS
OXYGEN SATURATION: 98 % | DIASTOLIC BLOOD PRESSURE: 136 MMHG | TEMPERATURE: 98 F | RESPIRATION RATE: 14 BRPM | HEIGHT: 65 IN | WEIGHT: 158 LBS | BODY MASS INDEX: 26.33 KG/M2 | SYSTOLIC BLOOD PRESSURE: 222 MMHG | HEART RATE: 80 BPM

## 2024-01-01 DIAGNOSIS — S72.009A HIP FRACTURE: ICD-10-CM
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
## 2024-01-01 LAB
ABO + RH BLD: NORMAL
ALBUMIN SERPL BCP-MCNC: 2.1 G/DL (ref 3.5–5.2)
ALBUMIN SERPL BCP-MCNC: 3.7 G/DL (ref 3.5–5.2)
ALP SERPL-CCNC: 114 U/L (ref 55–135)
ALP SERPL-CCNC: 78 U/L (ref 55–135)
ALT SERPL W/O P-5'-P-CCNC: 18 U/L (ref 10–44)
ALT SERPL W/O P-5'-P-CCNC: 317 U/L (ref 10–44)
ANION GAP SERPL CALC-SCNC: 10 MMOL/L (ref 8–16)
ANION GAP SERPL CALC-SCNC: 13 MMOL/L (ref 8–16)
ANION GAP SERPL CALC-SCNC: 21 MMOL/L (ref 8–16)
AST SERPL-CCNC: 25 U/L (ref 10–40)
AST SERPL-CCNC: 316 U/L (ref 10–40)
BASOPHILS # BLD AUTO: 0.02 K/UL (ref 0–0.2)
BASOPHILS # BLD AUTO: 0.02 K/UL (ref 0–0.2)
BASOPHILS # BLD AUTO: 0.03 K/UL (ref 0–0.2)
BASOPHILS NFR BLD: 0.2 % (ref 0–1.9)
BASOPHILS NFR BLD: 0.2 % (ref 0–1.9)
BASOPHILS NFR BLD: 0.5 % (ref 0–1.9)
BILIRUB SERPL-MCNC: 0.2 MG/DL (ref 0.1–1)
BILIRUB SERPL-MCNC: 0.3 MG/DL (ref 0.1–1)
BLD GP AB SCN CELLS X3 SERPL QL: NORMAL
BUN SERPL-MCNC: 17 MG/DL (ref 8–23)
BUN SERPL-MCNC: 18 MG/DL (ref 8–23)
BUN SERPL-MCNC: 19 MG/DL (ref 8–23)
CALCIUM SERPL-MCNC: 8.5 MG/DL (ref 8.7–10.5)
CALCIUM SERPL-MCNC: 9.1 MG/DL (ref 8.7–10.5)
CALCIUM SERPL-MCNC: 9.2 MG/DL (ref 8.7–10.5)
CHLORIDE SERPL-SCNC: 103 MMOL/L (ref 95–110)
CHLORIDE SERPL-SCNC: 104 MMOL/L (ref 95–110)
CHLORIDE SERPL-SCNC: 104 MMOL/L (ref 95–110)
CO2 SERPL-SCNC: 17 MMOL/L (ref 23–29)
CO2 SERPL-SCNC: 21 MMOL/L (ref 23–29)
CO2 SERPL-SCNC: 23 MMOL/L (ref 23–29)
CREAT SERPL-MCNC: 0.7 MG/DL (ref 0.5–1.4)
CREAT SERPL-MCNC: 0.8 MG/DL (ref 0.5–1.4)
CREAT SERPL-MCNC: 1 MG/DL (ref 0.5–1.4)
DIFFERENTIAL METHOD BLD: ABNORMAL
DIFFERENTIAL METHOD BLD: ABNORMAL
DIFFERENTIAL METHOD BLD: NORMAL
EOSINOPHIL # BLD AUTO: 0 K/UL (ref 0–0.5)
EOSINOPHIL # BLD AUTO: 0.1 K/UL (ref 0–0.5)
EOSINOPHIL # BLD AUTO: 0.1 K/UL (ref 0–0.5)
EOSINOPHIL NFR BLD: 0 % (ref 0–8)
EOSINOPHIL NFR BLD: 0.8 % (ref 0–8)
EOSINOPHIL NFR BLD: 1.3 % (ref 0–8)
ERYTHROCYTE [DISTWIDTH] IN BLOOD BY AUTOMATED COUNT: 14.5 % (ref 11.5–14.5)
ERYTHROCYTE [DISTWIDTH] IN BLOOD BY AUTOMATED COUNT: 14.6 % (ref 11.5–14.5)
ERYTHROCYTE [DISTWIDTH] IN BLOOD BY AUTOMATED COUNT: 14.6 % (ref 11.5–14.5)
EST. GFR  (NO RACE VARIABLE): 58 ML/MIN/1.73 M^2
EST. GFR  (NO RACE VARIABLE): >60 ML/MIN/1.73 M^2
EST. GFR  (NO RACE VARIABLE): >60 ML/MIN/1.73 M^2
FIO2: 100
GLUCOSE SERPL-MCNC: 109 MG/DL (ref 70–110)
GLUCOSE SERPL-MCNC: 116 MG/DL (ref 70–110)
GLUCOSE SERPL-MCNC: 324 MG/DL (ref 70–110)
HCO3 UR-SCNC: 20.5 MMOL/L (ref 24–28)
HCT VFR BLD AUTO: 30.7 % (ref 37–48.5)
HCT VFR BLD AUTO: 34.1 % (ref 37–48.5)
HCT VFR BLD AUTO: 41.4 % (ref 37–48.5)
HCT VFR BLD CALC: 19 %PCV (ref 36–54)
HCT VFR BLD CALC: 24 %PCV (ref 36–54)
HGB BLD-MCNC: 11.1 G/DL (ref 12–16)
HGB BLD-MCNC: 13.6 G/DL (ref 12–16)
HGB BLD-MCNC: 7 G/DL
HGB BLD-MCNC: 8 G/DL
HGB BLD-MCNC: 9.3 G/DL (ref 12–16)
IMM GRANULOCYTES # BLD AUTO: 0.02 K/UL (ref 0–0.04)
IMM GRANULOCYTES # BLD AUTO: 0.03 K/UL (ref 0–0.04)
IMM GRANULOCYTES # BLD AUTO: 0.2 K/UL (ref 0–0.04)
IMM GRANULOCYTES NFR BLD AUTO: 0.3 % (ref 0–0.5)
IMM GRANULOCYTES NFR BLD AUTO: 0.4 % (ref 0–0.5)
IMM GRANULOCYTES NFR BLD AUTO: 2.1 % (ref 0–0.5)
INR PPP: 1 (ref 0.8–1.2)
LACTATE SERPL-SCNC: >12 MMOL/L (ref 0.5–2.2)
LDH SERPL L TO P-CCNC: 12.54 MMOL/L (ref 0.5–2.2)
LYMPHOCYTES # BLD AUTO: 1.1 K/UL (ref 1–4.8)
LYMPHOCYTES # BLD AUTO: 1.8 K/UL (ref 1–4.8)
LYMPHOCYTES # BLD AUTO: 2.8 K/UL (ref 1–4.8)
LYMPHOCYTES NFR BLD: 12.7 % (ref 18–48)
LYMPHOCYTES NFR BLD: 27.8 % (ref 18–48)
LYMPHOCYTES NFR BLD: 29.1 % (ref 18–48)
MAGNESIUM SERPL-MCNC: 1.7 MG/DL (ref 1.6–2.6)
MCH RBC QN AUTO: 30.6 PG (ref 27–31)
MCH RBC QN AUTO: 30.7 PG (ref 27–31)
MCH RBC QN AUTO: 31.2 PG (ref 27–31)
MCHC RBC AUTO-ENTMCNC: 30.3 G/DL (ref 32–36)
MCHC RBC AUTO-ENTMCNC: 32.6 G/DL (ref 32–36)
MCHC RBC AUTO-ENTMCNC: 32.9 G/DL (ref 32–36)
MCV RBC AUTO: 101 FL (ref 82–98)
MCV RBC AUTO: 93 FL (ref 82–98)
MCV RBC AUTO: 96 FL (ref 82–98)
MONOCYTES # BLD AUTO: 0.5 K/UL (ref 0.3–1)
MONOCYTES # BLD AUTO: 0.5 K/UL (ref 0.3–1)
MONOCYTES # BLD AUTO: 0.7 K/UL (ref 0.3–1)
MONOCYTES NFR BLD: 5.3 % (ref 4–15)
MONOCYTES NFR BLD: 7.8 % (ref 4–15)
MONOCYTES NFR BLD: 8.7 % (ref 4–15)
NEUTROPHILS # BLD AUTO: 3.9 K/UL (ref 1.8–7.7)
NEUTROPHILS # BLD AUTO: 6.1 K/UL (ref 1.8–7.7)
NEUTROPHILS # BLD AUTO: 6.5 K/UL (ref 1.8–7.7)
NEUTROPHILS NFR BLD: 62.3 % (ref 38–73)
NEUTROPHILS NFR BLD: 63.3 % (ref 38–73)
NEUTROPHILS NFR BLD: 77.2 % (ref 38–73)
NRBC BLD-RTO: 0 /100 WBC
PCO2 BLDA: 94.7 MMHG (ref 35–45)
PH SMN: 6.94 [PH] (ref 7.35–7.45)
PHOSPHATE SERPL-MCNC: 3.7 MG/DL (ref 2.7–4.5)
PLATELET # BLD AUTO: 103 K/UL (ref 150–450)
PLATELET # BLD AUTO: 229 K/UL (ref 150–450)
PLATELET # BLD AUTO: 325 K/UL (ref 150–450)
PMV BLD AUTO: 10 FL (ref 9.2–12.9)
PMV BLD AUTO: 10.3 FL (ref 9.2–12.9)
PMV BLD AUTO: 9.8 FL (ref 9.2–12.9)
PO2 BLDA: 23 MMHG (ref 40–60)
POC BE: -12 MMOL/L
POC IONIZED CALCIUM: 1.44 MMOL/L (ref 1.06–1.42)
POC IONIZED CALCIUM: 1.55 MMOL/L (ref 1.06–1.42)
POC SATURATED O2: 18 % (ref 95–100)
POC TCO2: 23 MMOL/L (ref 24–29)
POCT GLUCOSE: 144 MG/DL (ref 70–110)
POTASSIUM BLD-SCNC: 3.1 MMOL/L (ref 3.5–5.1)
POTASSIUM BLD-SCNC: 4.3 MMOL/L (ref 3.5–5.1)
POTASSIUM SERPL-SCNC: 3.9 MMOL/L (ref 3.5–5.1)
POTASSIUM SERPL-SCNC: 4 MMOL/L (ref 3.5–5.1)
POTASSIUM SERPL-SCNC: 4.6 MMOL/L (ref 3.5–5.1)
PROT SERPL-MCNC: 4 G/DL (ref 6–8.4)
PROT SERPL-MCNC: 7 G/DL (ref 6–8.4)
PROTHROMBIN TIME: 11.4 SEC (ref 9–12.5)
RBC # BLD AUTO: 3.03 M/UL (ref 4–5.4)
RBC # BLD AUTO: 3.56 M/UL (ref 4–5.4)
RBC # BLD AUTO: 4.44 M/UL (ref 4–5.4)
SAMPLE: ABNORMAL
SODIUM BLD-SCNC: 137 MMOL/L (ref 136–145)
SODIUM BLD-SCNC: 142 MMOL/L (ref 136–145)
SODIUM SERPL-SCNC: 136 MMOL/L (ref 136–145)
SODIUM SERPL-SCNC: 138 MMOL/L (ref 136–145)
SODIUM SERPL-SCNC: 142 MMOL/L (ref 136–145)
SPECIMEN OUTDATE: NORMAL
TROPONIN I SERPL DL<=0.01 NG/ML-MCNC: 0.04 NG/ML (ref 0–0.03)
WBC # BLD AUTO: 6.3 K/UL (ref 3.9–12.7)
WBC # BLD AUTO: 8.41 K/UL (ref 3.9–12.7)
WBC # BLD AUTO: 9.63 K/UL (ref 3.9–12.7)

## 2024-01-01 PROCEDURE — 25000003 PHARM REV CODE 250: Performed by: ORTHOPAEDIC SURGERY

## 2024-01-01 PROCEDURE — 85347 COAGULATION TIME ACTIVATED: CPT

## 2024-01-01 PROCEDURE — 27201423 OPTIME MED/SURG SUP & DEVICES STERILE SUPPLY: Performed by: ORTHOPAEDIC SURGERY

## 2024-01-01 PROCEDURE — 96374 THER/PROPH/DIAG INJ IV PUSH: CPT

## 2024-01-01 PROCEDURE — 06HY33Z INSERTION OF INFUSION DEVICE INTO LOWER VEIN, PERCUTANEOUS APPROACH: ICD-10-PCS | Performed by: INTERNAL MEDICINE

## 2024-01-01 PROCEDURE — 31500 INSERT EMERGENCY AIRWAY: CPT

## 2024-01-01 PROCEDURE — 25000003 PHARM REV CODE 250

## 2024-01-01 PROCEDURE — 0QS736Z REPOSITION LEFT UPPER FEMUR WITH INTRAMEDULLARY INTERNAL FIXATION DEVICE, PERCUTANEOUS APPROACH: ICD-10-PCS | Performed by: ORTHOPAEDIC SURGERY

## 2024-01-01 PROCEDURE — 63600175 PHARM REV CODE 636 W HCPCS: Performed by: EMERGENCY MEDICINE

## 2024-01-01 PROCEDURE — 71000039 HC RECOVERY, EACH ADD'L HOUR: Performed by: ORTHOPAEDIC SURGERY

## 2024-01-01 PROCEDURE — 37000008 HC ANESTHESIA 1ST 15 MINUTES: Performed by: ORTHOPAEDIC SURGERY

## 2024-01-01 PROCEDURE — 86900 BLOOD TYPING SEROLOGIC ABO: CPT

## 2024-01-01 PROCEDURE — 80053 COMPREHEN METABOLIC PANEL: CPT | Performed by: INTERNAL MEDICINE

## 2024-01-01 PROCEDURE — 92950 HEART/LUNG RESUSCITATION CPR: CPT

## 2024-01-01 PROCEDURE — 5A12012 PERFORMANCE OF CARDIAC OUTPUT, SINGLE, MANUAL: ICD-10-PCS | Performed by: INTERNAL MEDICINE

## 2024-01-01 PROCEDURE — 85025 COMPLETE CBC W/AUTO DIFF WBC: CPT

## 2024-01-01 PROCEDURE — 25000003 PHARM REV CODE 250: Performed by: ANESTHESIOLOGY

## 2024-01-01 PROCEDURE — 63600175 PHARM REV CODE 636 W HCPCS: Performed by: ANESTHESIOLOGY

## 2024-01-01 PROCEDURE — 99285 EMERGENCY DEPT VISIT HI MDM: CPT | Mod: 25

## 2024-01-01 PROCEDURE — 71000033 HC RECOVERY, INTIAL HOUR: Performed by: ORTHOPAEDIC SURGERY

## 2024-01-01 PROCEDURE — 36415 COLL VENOUS BLD VENIPUNCTURE: CPT | Performed by: INTERNAL MEDICINE

## 2024-01-01 PROCEDURE — 83605 ASSAY OF LACTIC ACID: CPT

## 2024-01-01 PROCEDURE — 36000710: Performed by: ORTHOPAEDIC SURGERY

## 2024-01-01 PROCEDURE — 36000711: Performed by: ORTHOPAEDIC SURGERY

## 2024-01-01 PROCEDURE — 85610 PROTHROMBIN TIME: CPT

## 2024-01-01 PROCEDURE — 99900035 HC TECH TIME PER 15 MIN (STAT)

## 2024-01-01 PROCEDURE — 25000003 PHARM REV CODE 250: Performed by: INTERNAL MEDICINE

## 2024-01-01 PROCEDURE — 0BH17EZ INSERTION OF ENDOTRACHEAL AIRWAY INTO TRACHEA, VIA NATURAL OR ARTIFICIAL OPENING: ICD-10-PCS | Performed by: INTERNAL MEDICINE

## 2024-01-01 PROCEDURE — 63600175 PHARM REV CODE 636 W HCPCS: Performed by: INTERNAL MEDICINE

## 2024-01-01 PROCEDURE — 86850 RBC ANTIBODY SCREEN: CPT

## 2024-01-01 PROCEDURE — 37000009 HC ANESTHESIA EA ADD 15 MINS: Performed by: ORTHOPAEDIC SURGERY

## 2024-01-01 PROCEDURE — 25000003 PHARM REV CODE 250: Performed by: EMERGENCY MEDICINE

## 2024-01-01 PROCEDURE — 83735 ASSAY OF MAGNESIUM: CPT | Performed by: INTERNAL MEDICINE

## 2024-01-01 PROCEDURE — 84484 ASSAY OF TROPONIN QUANT: CPT | Performed by: INTERNAL MEDICINE

## 2024-01-01 PROCEDURE — 25000003 PHARM REV CODE 250: Performed by: NURSE ANESTHETIST, CERTIFIED REGISTERED

## 2024-01-01 PROCEDURE — 63600175 PHARM REV CODE 636 W HCPCS

## 2024-01-01 PROCEDURE — 64450 NJX AA&/STRD OTHER PN/BRANCH: CPT | Performed by: ANESTHESIOLOGY

## 2024-01-01 PROCEDURE — 84100 ASSAY OF PHOSPHORUS: CPT | Performed by: INTERNAL MEDICINE

## 2024-01-01 PROCEDURE — 11000001 HC ACUTE MED/SURG PRIVATE ROOM

## 2024-01-01 PROCEDURE — 76942 ECHO GUIDE FOR BIOPSY: CPT | Performed by: ANESTHESIOLOGY

## 2024-01-01 PROCEDURE — 83605 ASSAY OF LACTIC ACID: CPT | Performed by: INTERNAL MEDICINE

## 2024-01-01 PROCEDURE — 97162 PT EVAL MOD COMPLEX 30 MIN: CPT

## 2024-01-01 PROCEDURE — 63600175 PHARM REV CODE 636 W HCPCS: Performed by: NURSE ANESTHETIST, CERTIFIED REGISTERED

## 2024-01-01 PROCEDURE — 3E03317 INTRODUCTION OF OTHER THROMBOLYTIC INTO PERIPHERAL VEIN, PERCUTANEOUS APPROACH: ICD-10-PCS | Performed by: INTERNAL MEDICINE

## 2024-01-01 PROCEDURE — 63600175 PHARM REV CODE 636 W HCPCS: Performed by: ORTHOPAEDIC SURGERY

## 2024-01-01 PROCEDURE — 80053 COMPREHEN METABOLIC PANEL: CPT

## 2024-01-01 PROCEDURE — 85025 COMPLETE CBC W/AUTO DIFF WBC: CPT | Performed by: INTERNAL MEDICINE

## 2024-01-01 PROCEDURE — C1713 ANCHOR/SCREW BN/BN,TIS/BN: HCPCS | Performed by: ORTHOPAEDIC SURGERY

## 2024-01-01 PROCEDURE — C1769 GUIDE WIRE: HCPCS | Performed by: ORTHOPAEDIC SURGERY

## 2024-01-01 PROCEDURE — 80048 BASIC METABOLIC PNL TOTAL CA: CPT | Mod: XB | Performed by: INTERNAL MEDICINE

## 2024-01-01 PROCEDURE — 86901 BLOOD TYPING SEROLOGIC RH(D): CPT

## 2024-01-01 PROCEDURE — 97530 THERAPEUTIC ACTIVITIES: CPT

## 2024-01-01 DEVICE — SCREW XL25 IM NAIL 38X5MM: Type: IMPLANTABLE DEVICE | Site: HIP | Status: FUNCTIONAL

## 2024-01-01 DEVICE — NAIL TFNA 12MM 130DEG 170MM ST: Type: IMPLANTABLE DEVICE | Site: HIP | Status: FUNCTIONAL

## 2024-01-01 DEVICE — BLADE HELICAL PERF GOLD 95MM: Type: IMPLANTABLE DEVICE | Site: HIP | Status: FUNCTIONAL

## 2024-01-01 RX ORDER — HYDROMORPHONE HYDROCHLORIDE 2 MG/ML
0.4 INJECTION, SOLUTION INTRAMUSCULAR; INTRAVENOUS; SUBCUTANEOUS EVERY 5 MIN PRN
Status: DISCONTINUED | OUTPATIENT
Start: 2024-01-01 | End: 2024-01-01

## 2024-01-01 RX ORDER — SODIUM BICARBONATE 1 MEQ/ML
SYRINGE (ML) INTRAVENOUS CODE/TRAUMA/SEDATION MEDICATION
Status: COMPLETED | OUTPATIENT
Start: 2024-01-01 | End: 2024-01-01

## 2024-01-01 RX ORDER — ROPIVACAINE HYDROCHLORIDE 5 MG/ML
INJECTION, SOLUTION EPIDURAL; INFILTRATION; PERINEURAL
Status: COMPLETED | OUTPATIENT
Start: 2024-01-01 | End: 2024-01-01

## 2024-01-01 RX ORDER — PROPOFOL 10 MG/ML
VIAL (ML) INTRAVENOUS CONTINUOUS PRN
Status: DISCONTINUED | OUTPATIENT
Start: 2024-01-01 | End: 2024-01-01

## 2024-01-01 RX ORDER — LIDOCAINE HYDROCHLORIDE 20 MG/ML
INJECTION INTRAVENOUS
Status: DISCONTINUED | OUTPATIENT
Start: 2024-01-01 | End: 2024-01-01

## 2024-01-01 RX ORDER — ATROPINE SULFATE 0.1 MG/ML
INJECTION INTRAVENOUS CODE/TRAUMA/SEDATION MEDICATION
Status: COMPLETED | OUTPATIENT
Start: 2024-01-01 | End: 2024-01-01

## 2024-01-01 RX ORDER — PROCHLORPERAZINE EDISYLATE 5 MG/ML
5 INJECTION INTRAMUSCULAR; INTRAVENOUS EVERY 30 MIN PRN
Status: DISCONTINUED | OUTPATIENT
Start: 2024-01-01 | End: 2024-01-01

## 2024-01-01 RX ORDER — POLYETHYLENE GLYCOL 3350 17 G/17G
17 POWDER, FOR SOLUTION ORAL DAILY
Status: DISCONTINUED | OUTPATIENT
Start: 2024-01-01 | End: 2024-01-01 | Stop reason: HOSPADM

## 2024-01-01 RX ORDER — EPINEPHRINE 0.1 MG/ML
INJECTION INTRAVENOUS CODE/TRAUMA/SEDATION MEDICATION
Status: COMPLETED | OUTPATIENT
Start: 2024-01-01 | End: 2024-01-01

## 2024-01-01 RX ORDER — OXYCODONE HYDROCHLORIDE 5 MG/1
5 TABLET ORAL
Status: DISCONTINUED | OUTPATIENT
Start: 2024-01-01 | End: 2024-01-01

## 2024-01-01 RX ORDER — LEVOTHYROXINE SODIUM 75 UG/1
75 TABLET ORAL
Status: DISCONTINUED | OUTPATIENT
Start: 2024-01-01 | End: 2024-01-01 | Stop reason: HOSPADM

## 2024-01-01 RX ORDER — SODIUM CHLORIDE 0.9 % (FLUSH) 0.9 %
3 SYRINGE (ML) INJECTION
Status: DISCONTINUED | OUTPATIENT
Start: 2024-01-01 | End: 2024-01-01

## 2024-01-01 RX ORDER — ESCITALOPRAM OXALATE 10 MG/1
20 TABLET ORAL DAILY
Status: DISCONTINUED | OUTPATIENT
Start: 2024-01-01 | End: 2024-01-01 | Stop reason: HOSPADM

## 2024-01-01 RX ORDER — FAMOTIDINE 20 MG/1
20 TABLET, FILM COATED ORAL DAILY
Status: DISCONTINUED | OUTPATIENT
Start: 2024-01-01 | End: 2024-01-01 | Stop reason: HOSPADM

## 2024-01-01 RX ORDER — ONDANSETRON 4 MG/1
4 TABLET, ORALLY DISINTEGRATING ORAL EVERY 6 HOURS PRN
Status: DISCONTINUED | OUTPATIENT
Start: 2024-01-01 | End: 2024-01-01 | Stop reason: SDUPTHER

## 2024-01-01 RX ORDER — ONDANSETRON HYDROCHLORIDE 2 MG/ML
INJECTION, SOLUTION INTRAVENOUS
Status: DISCONTINUED | OUTPATIENT
Start: 2024-01-01 | End: 2024-01-01

## 2024-01-01 RX ORDER — MORPHINE SULFATE 4 MG/ML
4 INJECTION, SOLUTION INTRAMUSCULAR; INTRAVENOUS
Status: COMPLETED | OUTPATIENT
Start: 2024-01-01 | End: 2024-01-01

## 2024-01-01 RX ORDER — SODIUM CHLORIDE 0.9 % (FLUSH) 0.9 %
5 SYRINGE (ML) INJECTION
Status: DISCONTINUED | OUTPATIENT
Start: 2024-01-01 | End: 2024-01-01 | Stop reason: HOSPADM

## 2024-01-01 RX ORDER — DEXTROAMPHETAMINE SACCHARATE, AMPHETAMINE ASPARTATE, DEXTROAMPHETAMINE SULFATE AND AMPHETAMINE SULFATE 3.75; 3.75; 3.75; 3.75 MG/1; MG/1; MG/1; MG/1
15 TABLET ORAL DAILY
COMMUNITY
Start: 2024-01-01

## 2024-01-01 RX ORDER — AMOXICILLIN 250 MG
1 CAPSULE ORAL 2 TIMES DAILY
Status: DISCONTINUED | OUTPATIENT
Start: 2024-01-01 | End: 2024-01-01 | Stop reason: HOSPADM

## 2024-01-01 RX ORDER — CALCIUM CHLORIDE INJECTION 100 MG/ML
INJECTION, SOLUTION INTRAVENOUS CODE/TRAUMA/SEDATION MEDICATION
Status: COMPLETED | OUTPATIENT
Start: 2024-01-01 | End: 2024-01-01

## 2024-01-01 RX ORDER — GLUCAGON 1 MG
1 KIT INJECTION
Status: DISCONTINUED | OUTPATIENT
Start: 2024-01-01 | End: 2024-01-01

## 2024-01-01 RX ORDER — HYDROCODONE BITARTRATE AND ACETAMINOPHEN 10; 325 MG/1; MG/1
1 TABLET ORAL EVERY 4 HOURS PRN
Status: DISCONTINUED | OUTPATIENT
Start: 2024-01-01 | End: 2024-01-01 | Stop reason: SDUPTHER

## 2024-01-01 RX ORDER — ARIPIPRAZOLE 2 MG/1
2 TABLET ORAL DAILY
Status: DISCONTINUED | OUTPATIENT
Start: 2024-01-01 | End: 2024-01-01 | Stop reason: HOSPADM

## 2024-01-01 RX ORDER — HYDROCODONE BITARTRATE AND ACETAMINOPHEN 10; 325 MG/1; MG/1
1 TABLET ORAL EVERY 4 HOURS PRN
Status: DISCONTINUED | OUTPATIENT
Start: 2024-01-01 | End: 2024-01-01 | Stop reason: CLARIF

## 2024-01-01 RX ORDER — OXYCODONE AND ACETAMINOPHEN 5; 325 MG/1; MG/1
1 TABLET ORAL EVERY 4 HOURS PRN
Status: DISCONTINUED | OUTPATIENT
Start: 2024-01-01 | End: 2024-01-01

## 2024-01-01 RX ORDER — PANTOPRAZOLE SODIUM 40 MG/1
40 TABLET, DELAYED RELEASE ORAL DAILY
Status: DISCONTINUED | OUTPATIENT
Start: 2024-01-01 | End: 2024-01-01 | Stop reason: HOSPADM

## 2024-01-01 RX ORDER — MORPHINE SULFATE 4 MG/ML
4 INJECTION, SOLUTION INTRAMUSCULAR; INTRAVENOUS EVERY 4 HOURS PRN
Status: DISCONTINUED | OUTPATIENT
Start: 2024-01-01 | End: 2024-01-01 | Stop reason: HOSPADM

## 2024-01-01 RX ORDER — PHENYLEPHRINE HYDROCHLORIDE 10 MG/ML
INJECTION INTRAVENOUS
Status: DISCONTINUED | OUTPATIENT
Start: 2024-01-01 | End: 2024-01-01

## 2024-01-01 RX ORDER — NAPROXEN SODIUM 220 MG/1
81 TABLET, FILM COATED ORAL 2 TIMES DAILY
Status: DISCONTINUED | OUTPATIENT
Start: 2024-01-01 | End: 2024-01-01 | Stop reason: HOSPADM

## 2024-01-01 RX ORDER — ACETAMINOPHEN 325 MG/1
650 TABLET ORAL EVERY 4 HOURS PRN
Status: DISCONTINUED | OUTPATIENT
Start: 2024-01-01 | End: 2024-01-01 | Stop reason: HOSPADM

## 2024-01-01 RX ORDER — NALOXONE HCL 0.4 MG/ML
VIAL (ML) INJECTION CODE/TRAUMA/SEDATION MEDICATION
Status: COMPLETED | OUTPATIENT
Start: 2024-01-01 | End: 2024-01-01

## 2024-01-01 RX ORDER — ESCITALOPRAM OXALATE 10 MG/1
10 TABLET ORAL NIGHTLY
Status: DISCONTINUED | OUTPATIENT
Start: 2024-01-01 | End: 2024-01-01 | Stop reason: HOSPADM

## 2024-01-01 RX ORDER — MEPERIDINE HYDROCHLORIDE 25 MG/ML
12.5 INJECTION INTRAMUSCULAR; INTRAVENOUS; SUBCUTANEOUS ONCE AS NEEDED
Status: DISCONTINUED | OUTPATIENT
Start: 2024-01-01 | End: 2024-01-01

## 2024-01-01 RX ORDER — DEXAMETHASONE SODIUM PHOSPHATE 4 MG/ML
INJECTION, SOLUTION INTRA-ARTICULAR; INTRALESIONAL; INTRAMUSCULAR; INTRAVENOUS; SOFT TISSUE
Status: DISCONTINUED | OUTPATIENT
Start: 2024-01-01 | End: 2024-01-01

## 2024-01-01 RX ORDER — HYDROCODONE BITARTRATE AND ACETAMINOPHEN 5; 325 MG/1; MG/1
1 TABLET ORAL EVERY 4 HOURS PRN
Status: DISCONTINUED | OUTPATIENT
Start: 2024-01-01 | End: 2024-01-01 | Stop reason: HOSPADM

## 2024-01-01 RX ORDER — MORPHINE SULFATE 2 MG/ML
2 INJECTION, SOLUTION INTRAMUSCULAR; INTRAVENOUS EVERY 4 HOURS PRN
Status: DISCONTINUED | OUTPATIENT
Start: 2024-01-01 | End: 2024-01-01

## 2024-01-01 RX ORDER — TALC
6 POWDER (GRAM) TOPICAL NIGHTLY PRN
Status: DISCONTINUED | OUTPATIENT
Start: 2024-01-01 | End: 2024-01-01 | Stop reason: SDUPTHER

## 2024-01-01 RX ORDER — METOCLOPRAMIDE HYDROCHLORIDE 5 MG/ML
5 INJECTION INTRAMUSCULAR; INTRAVENOUS EVERY 6 HOURS PRN
Status: DISCONTINUED | OUTPATIENT
Start: 2024-01-01 | End: 2024-01-01 | Stop reason: HOSPADM

## 2024-01-01 RX ORDER — TRANEXAMIC ACID 100 MG/ML
INJECTION, SOLUTION INTRAVENOUS
Status: DISCONTINUED | OUTPATIENT
Start: 2024-01-01 | End: 2024-01-01

## 2024-01-01 RX ORDER — DEXTROSE MONOHYDRATE AND SODIUM CHLORIDE 5; .9 G/100ML; G/100ML
INJECTION, SOLUTION INTRAVENOUS CONTINUOUS
Status: DISCONTINUED | OUTPATIENT
Start: 2024-01-01 | End: 2024-01-01 | Stop reason: HOSPADM

## 2024-01-01 RX ORDER — SODIUM CHLORIDE 0.9 % (FLUSH) 0.9 %
10 SYRINGE (ML) INJECTION
Status: DISCONTINUED | OUTPATIENT
Start: 2024-01-01 | End: 2024-01-01 | Stop reason: HOSPADM

## 2024-01-01 RX ORDER — ONDANSETRON HYDROCHLORIDE 2 MG/ML
4 INJECTION, SOLUTION INTRAVENOUS EVERY 6 HOURS PRN
Status: DISCONTINUED | OUTPATIENT
Start: 2024-01-01 | End: 2024-01-01 | Stop reason: SDUPTHER

## 2024-01-01 RX ORDER — HYDROCODONE BITARTRATE AND ACETAMINOPHEN 10; 325 MG/1; MG/1
1 TABLET ORAL EVERY 4 HOURS PRN
Status: DISCONTINUED | OUTPATIENT
Start: 2024-01-01 | End: 2024-01-01 | Stop reason: HOSPADM

## 2024-01-01 RX ORDER — AMIODARONE HYDROCHLORIDE 150 MG/3ML
INJECTION, SOLUTION INTRAVENOUS CODE/TRAUMA/SEDATION MEDICATION
Status: COMPLETED | OUTPATIENT
Start: 2024-01-01 | End: 2024-01-01

## 2024-01-01 RX ORDER — ESCITALOPRAM OXALATE 10 MG/1
10 TABLET ORAL
Status: DISCONTINUED | OUTPATIENT
Start: 2024-01-01 | End: 2024-01-01 | Stop reason: HOSPADM

## 2024-01-01 RX ORDER — TALC
9 POWDER (GRAM) TOPICAL NIGHTLY PRN
Status: DISCONTINUED | OUTPATIENT
Start: 2024-01-01 | End: 2024-01-01 | Stop reason: HOSPADM

## 2024-01-01 RX ORDER — LORAZEPAM 0.5 MG/1
2 TABLET ORAL 2 TIMES DAILY PRN
Status: DISCONTINUED | OUTPATIENT
Start: 2024-01-01 | End: 2024-01-01 | Stop reason: HOSPADM

## 2024-01-01 RX ORDER — FENTANYL CITRATE 50 UG/ML
INJECTION, SOLUTION INTRAMUSCULAR; INTRAVENOUS
Status: DISCONTINUED | OUTPATIENT
Start: 2024-01-01 | End: 2024-01-01

## 2024-01-01 RX ORDER — LANOLIN ALCOHOL/MO/W.PET/CERES
1 CREAM (GRAM) TOPICAL DAILY
Status: DISCONTINUED | OUTPATIENT
Start: 2024-01-01 | End: 2024-01-01 | Stop reason: HOSPADM

## 2024-01-01 RX ORDER — CELECOXIB 200 MG/1
200 CAPSULE ORAL 2 TIMES DAILY
Status: DISCONTINUED | OUTPATIENT
Start: 2024-01-01 | End: 2024-01-01 | Stop reason: HOSPADM

## 2024-01-01 RX ORDER — PROPOFOL 10 MG/ML
VIAL (ML) INTRAVENOUS
Status: DISCONTINUED | OUTPATIENT
Start: 2024-01-01 | End: 2024-01-01

## 2024-01-01 RX ORDER — DIPHENHYDRAMINE HYDROCHLORIDE 50 MG/ML
INJECTION INTRAMUSCULAR; INTRAVENOUS
Status: DISCONTINUED | OUTPATIENT
Start: 2024-01-01 | End: 2024-01-01

## 2024-01-01 RX ORDER — DOPAMINE HYDROCHLORIDE 160 MG/100ML
INJECTION, SOLUTION INTRAVENOUS
Status: COMPLETED | OUTPATIENT
Start: 2024-01-01 | End: 2024-01-01

## 2024-01-01 RX ORDER — ONDANSETRON 8 MG/1
8 TABLET, ORALLY DISINTEGRATING ORAL EVERY 8 HOURS PRN
Status: DISCONTINUED | OUTPATIENT
Start: 2024-01-01 | End: 2024-01-01 | Stop reason: HOSPADM

## 2024-01-01 RX ADMIN — ROPIVACAINE HYDROCHLORIDE 3 ML: 5 INJECTION, SOLUTION EPIDURAL; INFILTRATION; PERINEURAL at 07:07

## 2024-01-01 RX ADMIN — MORPHINE SULFATE 4 MG: 4 INJECTION, SOLUTION INTRAMUSCULAR; INTRAVENOUS at 09:07

## 2024-01-01 RX ADMIN — SODIUM CHLORIDE 1000 ML: 0.9 INJECTION, SOLUTION INTRAVENOUS at 01:07

## 2024-01-01 RX ADMIN — CALCIUM CHLORIDE 1 G: 100 INJECTION, SOLUTION INTRAVENOUS at 12:07

## 2024-01-01 RX ADMIN — EPINEPHRINE 1 MG: 0.1 INJECTION, SOLUTION ENDOTRACHEAL; INTRACARDIAC; INTRAVENOUS at 01:07

## 2024-01-01 RX ADMIN — ONDANSETRON HYDROCHLORIDE 4 MG: 2 INJECTION INTRAMUSCULAR; INTRAVENOUS at 07:07

## 2024-01-01 RX ADMIN — FERROUS SULFATE TAB 325 MG (65 MG ELEMENTAL FE) 1 EACH: 325 (65 FE) TAB at 10:07

## 2024-01-01 RX ADMIN — DOPAMINE HYDROCHLORIDE 20 MCG/KG/MIN: 160 INJECTION, SOLUTION INTRAVENOUS at 01:07

## 2024-01-01 RX ADMIN — MORPHINE SULFATE 4 MG: 4 INJECTION, SOLUTION INTRAMUSCULAR; INTRAVENOUS at 11:07

## 2024-01-01 RX ADMIN — EPINEPHRINE 1 MG: 0.1 INJECTION, SOLUTION ENDOTRACHEAL; INTRACARDIAC; INTRAVENOUS at 12:07

## 2024-01-01 RX ADMIN — PHENYLEPHRINE HYDROCHLORIDE 200 MCG: 10 INJECTION INTRAVENOUS at 07:07

## 2024-01-01 RX ADMIN — PHENYLEPHRINE HYDROCHLORIDE 100 MCG: 10 INJECTION INTRAVENOUS at 08:07

## 2024-01-01 RX ADMIN — MORPHINE SULFATE 4 MG: 4 INJECTION, SOLUTION INTRAMUSCULAR; INTRAVENOUS at 03:07

## 2024-01-01 RX ADMIN — LEVOTHYROXINE SODIUM 75 MCG: 75 TABLET ORAL at 05:07

## 2024-01-01 RX ADMIN — SODIUM BICARBONATE 150 MEQ: 84 INJECTION, SOLUTION INTRAVENOUS at 12:07

## 2024-01-01 RX ADMIN — POLYETHYLENE GLYCOL 3350 17 G: 17 POWDER, FOR SOLUTION ORAL at 10:07

## 2024-01-01 RX ADMIN — DEXAMETHASONE SODIUM PHOSPHATE 4 MG: 4 INJECTION, SOLUTION INTRAMUSCULAR; INTRAVENOUS at 08:07

## 2024-01-01 RX ADMIN — PROPOFOL 25 MCG/KG/MIN: 10 INJECTION, EMULSION INTRAVENOUS at 08:07

## 2024-01-01 RX ADMIN — PHENYLEPHRINE HYDROCHLORIDE 200 MCG: 10 INJECTION INTRAVENOUS at 08:07

## 2024-01-01 RX ADMIN — SODIUM CHLORIDE, SODIUM LACTATE, POTASSIUM CHLORIDE, AND CALCIUM CHLORIDE: .6; .31; .03; .02 INJECTION, SOLUTION INTRAVENOUS at 07:07

## 2024-01-01 RX ADMIN — NALOXONE HYDROCHLORIDE 0.4 MG: 0.4 INJECTION, SOLUTION INTRAMUSCULAR; INTRAVENOUS; SUBCUTANEOUS at 12:07

## 2024-01-01 RX ADMIN — AMIODARONE HYDROCHLORIDE 150 MG: 50 INJECTION, SOLUTION INTRAVENOUS at 12:07

## 2024-01-01 RX ADMIN — ARIPIPRAZOLE 2 MG: 2 TABLET ORAL at 10:07

## 2024-01-01 RX ADMIN — DEXTROSE AND SODIUM CHLORIDE: 5; 900 INJECTION, SOLUTION INTRAVENOUS at 10:07

## 2024-01-01 RX ADMIN — OXYCODONE HYDROCHLORIDE 5 MG: 5 TABLET ORAL at 09:07

## 2024-01-01 RX ADMIN — DIPHENHYDRAMINE HYDROCHLORIDE 25 MG: 50 INJECTION, SOLUTION INTRAMUSCULAR; INTRAVENOUS at 08:07

## 2024-01-01 RX ADMIN — PANTOPRAZOLE SODIUM 40 MG: 40 TABLET, DELAYED RELEASE ORAL at 10:07

## 2024-01-01 RX ADMIN — CELECOXIB 200 MG: 200 CAPSULE ORAL at 11:07

## 2024-01-01 RX ADMIN — ONDANSETRON 4 MG: 2 INJECTION INTRAMUSCULAR; INTRAVENOUS at 09:07

## 2024-01-01 RX ADMIN — ATROPINE SULFATE 0.5 MG: 0.1 INJECTION, SOLUTION INTRAVENOUS at 12:07

## 2024-01-01 RX ADMIN — CALCIUM CHLORIDE 100 MG: 100 INJECTION, SOLUTION INTRAVENOUS at 12:07

## 2024-01-01 RX ADMIN — DOCUSATE SODIUM AND SENNOSIDES 1 TABLET: 8.6; 5 TABLET, FILM COATED ORAL at 10:07

## 2024-01-01 RX ADMIN — ASPIRIN 81 MG CHEWABLE TABLET 81 MG: 81 TABLET CHEWABLE at 10:07

## 2024-01-01 RX ADMIN — ROPIVACAINE HYDROCHLORIDE 20 ML: 5 INJECTION, SOLUTION EPIDURAL; INFILTRATION; PERINEURAL at 07:07

## 2024-01-01 RX ADMIN — DOCUSATE SODIUM AND SENNOSIDES 1 TABLET: 8.6; 5 TABLET, FILM COATED ORAL at 04:07

## 2024-01-01 RX ADMIN — ATROPINE SULFATE 1 MG: 0.1 INJECTION, SOLUTION INTRAVENOUS at 12:07

## 2024-01-01 RX ADMIN — DEXTROSE 2 G: 50 INJECTION, SOLUTION INTRAVENOUS at 08:07

## 2024-01-01 RX ADMIN — ESCITALOPRAM OXALATE 10 MG: 10 TABLET ORAL at 08:07

## 2024-01-01 RX ADMIN — NOREPINEPHRINE BITARTRATE 1 MCG/KG/MIN: 1 INJECTION, SOLUTION, CONCENTRATE INTRAVENOUS at 12:07

## 2024-01-01 RX ADMIN — LIDOCAINE HYDROCHLORIDE 75 MG: 20 INJECTION, SOLUTION INTRAVENOUS at 07:07

## 2024-01-01 RX ADMIN — MORPHINE SULFATE 4 MG: 4 INJECTION, SOLUTION INTRAMUSCULAR; INTRAVENOUS at 04:07

## 2024-01-01 RX ADMIN — FENTANYL CITRATE 100 MCG: 50 INJECTION, SOLUTION INTRAMUSCULAR; INTRAVENOUS at 07:07

## 2024-01-01 RX ADMIN — ESCITALOPRAM OXALATE 20 MG: 10 TABLET ORAL at 10:07

## 2024-01-01 RX ADMIN — PROPOFOL 50 MG: 10 INJECTION, EMULSION INTRAVENOUS at 07:07

## 2024-01-01 RX ADMIN — PHENYLEPHRINE HYDROCHLORIDE 0.5 MCG/KG/MIN: 10 INJECTION INTRAVENOUS at 08:07

## 2024-01-01 RX ADMIN — TRANEXAMIC ACID 1000 MG: 100 INJECTION, SOLUTION INTRAVENOUS at 07:07

## 2024-01-01 RX ADMIN — SODIUM BICARBONATE 150 MEQ: 84 INJECTION, SOLUTION INTRAVENOUS at 01:07

## 2024-01-27 ENCOUNTER — HOSPITAL ENCOUNTER (EMERGENCY)
Facility: OTHER | Age: 79
Discharge: HOME | End: 2024-01-27
Attending: EMERGENCY MEDICINE
Payer: MEDICARE

## 2024-01-27 VITALS
HEIGHT: 63 IN | DIASTOLIC BLOOD PRESSURE: 72 MMHG | WEIGHT: 152 LBS | HEART RATE: 71 BPM | TEMPERATURE: 98 F | OXYGEN SATURATION: 96 % | BODY MASS INDEX: 26.93 KG/M2 | SYSTOLIC BLOOD PRESSURE: 168 MMHG | RESPIRATION RATE: 15 BRPM

## 2024-01-27 DIAGNOSIS — I80.02 THROMBOPHLEBITIS OF SUPERFICIAL VEINS OF LEFT LOWER EXTREMITY: Primary | ICD-10-CM

## 2024-01-27 DIAGNOSIS — M79.89 LEG SWELLING: ICD-10-CM

## 2024-01-27 PROCEDURE — 99284 EMERGENCY DEPT VISIT MOD MDM: CPT | Mod: 25

## 2024-01-27 RX ORDER — IBUPROFEN 600 MG/1
600 TABLET ORAL EVERY 6 HOURS PRN
Qty: 20 TABLET | Refills: 0 | Status: SHIPPED | OUTPATIENT
Start: 2024-01-27

## 2024-01-27 NOTE — ED PROVIDER NOTES
Encounter Date: 1/27/2024       History     Chief Complaint   Patient presents with    Leg Pain     Reports pain to the L calf onset 2 days ago. Pt recently returned from a trip where she drove a total of 12 hours round trip. Denies blood thinners use. Reports tenderness to area, no redness noted. Sent by MD for evaluation for blood clot.      78-year-old female presents with complaint of leg swelling.  She reports that she was on a road trip with friends earlier this week where she was immobilized in a car for more than 6 hours, and noticed 2 days afterwards that her left calf had an area of pain, hardness, and swelling.  There has no overlying skin change or rash.  She denies any shortness of breath or chest pain.  Symptoms have not improved over this week, and she was advised to come in for evaluation by a friend.        Review of patient's allergies indicates:   Allergen Reactions    Demerol [meperidine] Nausea And Vomiting     Past Medical History:   Diagnosis Date    Allergy     Arthritis     Depression     Lumbar disc disease     PONV (postoperative nausea and vomiting)     Thyroid disease      Past Surgical History:   Procedure Laterality Date    DILATION AND CURETTAGE OF UTERUS  07/11/2016    FRACTURE SURGERY      Right wrist, long ago     HYSTEROSCOPY  07/11/2016    JOINT REPLACEMENT      right TKA    KNEE SURGERY Right     X 2 2014 & 2015    LASER LAPAROSCOPY      age 36    SHOULDER SURGERY       right X 1 2005 left X 2 2006 & unkl     TONSILLECTOMY      as a child, 5 y/o     WRIST SURGERY Right      Family History   Adopted: Yes   Problem Relation Age of Onset    Breast cancer Neg Hx     Colon cancer Neg Hx     Ovarian cancer Neg Hx      Social History     Tobacco Use    Smoking status: Former    Smokeless tobacco: Never   Substance Use Topics    Alcohol use: Yes     Comment: pony beer daily, martini weekend     Drug use: No     Review of Systems   Constitutional:  Negative for chills and fever.   HENT:   Negative for congestion and sore throat.    Eyes:  Negative for visual disturbance.   Respiratory:  Negative for cough and shortness of breath.    Cardiovascular:  Negative for chest pain and palpitations.   Gastrointestinal:  Negative for abdominal pain, diarrhea and vomiting.   Genitourinary:  Negative for decreased urine volume, dysuria and vaginal discharge.   Musculoskeletal:  Positive for myalgias (Calf pain). Negative for joint swelling, neck pain and neck stiffness.   Skin:  Negative for rash and wound.   Neurological:  Negative for weakness, numbness and headaches.   Psychiatric/Behavioral:  Negative for confusion.        Physical Exam     Initial Vitals [01/27/24 1129]   BP Pulse Resp Temp SpO2   122/60 93 18 97.8 °F (36.6 °C) 96 %      MAP       --         Physical Exam    Nursing note and vitals reviewed.  Constitutional: She appears well-developed and well-nourished. No distress.   HENT:   Head: Normocephalic and atraumatic.   Mouth/Throat: Oropharynx is clear and moist. No oropharyngeal exudate.   Eyes: Conjunctivae and EOM are normal. Pupils are equal, round, and reactive to light.   Neck: Neck supple.   Cardiovascular:  Normal rate and normal heart sounds.           No murmur heard.  Pulmonary/Chest: Breath sounds normal. No respiratory distress. She has no wheezes. She has no rhonchi. She has no rales.   Abdominal: Abdomen is soft. There is no abdominal tenderness. There is no rebound.   Musculoskeletal:         General: No tenderness or edema.      Cervical back: Neck supple.      Right lower leg: No swelling. No edema.      Left lower leg: Swelling (Focal swelling and tenderness to the posterior calf) present. No edema.      Comments: Bilateral lower extremities with extensive varicose veins and superficial spider veins.     Neurological: She is alert and oriented to person, place, and time. She has normal strength. GCS score is 15. GCS eye subscore is 4. GCS verbal subscore is 5. GCS motor  subscore is 6.   Skin: Skin is warm and dry. No rash noted.   Psychiatric: She has a normal mood and affect. Thought content normal.         ED Course   Procedures  Labs Reviewed   HIV 1 / 2 ANTIBODY   HEPATITIS C ANTIBODY          Imaging Results              US Lower Extremity Veins Left (Final result)  Result time 01/27/24 14:04:46      Final result by Kunal Tapia MD (01/27/24 14:04:46)                   Impression:      No evidence of deep venous thrombosis in the left lower extremity.    Left calf superficial thrombophlebitis.      Electronically signed by: Kunal Tapia MD  Date:    01/27/2024  Time:    14:04               Narrative:    EXAMINATION:  US LOWER EXTREMITY VEINS LEFT    CLINICAL HISTORY:  Other specified soft tissue disorders    TECHNIQUE:  Duplex and color flow Doppler evaluation and graded compression of the left lower extremity veins was performed.    COMPARISON:  Left lower extremity venous upper ultrasound 04/04/2018    FINDINGS:  Left thigh veins: The common femoral, femoral, popliteal, upper greater saphenous, and deep femoral veins are patent and free of thrombus. The veins are normally compressible and have normal phasic flow and augmentation response.    Left calf veins: The visualized calf veins are patent.    Contralateral CFV: The contralateral (right) common femoral vein is patent and free of thrombus.    Miscellaneous: There is a thrombosed superficial vein at the lateral aspect of the left calf.                                       Medications - No data to display  Medical Decision Making  Emergent evaluation of 78-year-old female with calf pain and tenderness after travel.  On exam she has an area in the left calf which is tender and slightly indurated, varicose veins bilaterally.  Ultrasound does show superficial thrombophlebitis, but no DVT.  Given the location it is low risk for extension and transformation into a DVT.  No anticoagulation is recommended.  She is advised to  use compression stockings, take NSAIDs, and use warm compresses and elevation.  She is advised to follow-up with her PCP and given strict return precautions.    Amount and/or Complexity of Data Reviewed  Radiology: ordered. Decision-making details documented in ED Course.    Risk  Prescription drug management.                                      Clinical Impression:  Final diagnoses:  [M79.89] Leg swelling  [I80.02] Thrombophlebitis of superficial veins of left lower extremity (Primary)          ED Disposition Condition    Discharge Stable          ED Prescriptions       Medication Sig Dispense Start Date End Date Auth. Provider    ibuprofen (ADVIL,MOTRIN) 600 MG tablet Take 1 tablet (600 mg total) by mouth every 6 (six) hours as needed for Pain. 20 tablet 1/27/2024 -- Savannah Urrutia MD          Follow-up Information       Follow up With Specialties Details Why Contact Info    Ramon Lovell MD Internal Medicine Schedule an appointment as soon as possible for a visit   [address] [address]  [phone]               Savannah Urrutia MD  01/27/24 5480

## 2024-01-27 NOTE — FIRST PROVIDER EVALUATION
Emergency Department TeleTriage Encounter Note      CHIEF COMPLAINT    Chief Complaint   Patient presents with    Leg Pain     Reports pain to the L calf onset 2 days ago. Pt recently returned from a trip where she drove a total of 12 hours round trip. Denies blood thinners use. Reports tenderness to area, no redness noted. Sent by MD for evaluation for blood clot.        VITAL SIGNS   Initial Vitals [01/27/24 1129]   BP Pulse Resp Temp SpO2   122/60 93 18 97.8 °F (36.6 °C) 96 %      MAP       --            ALLERGIES    Review of patient's allergies indicates:   Allergen Reactions    Demerol [meperidine] Nausea And Vomiting       PROVIDER TRIAGE NOTE  78 year old female presents to the ER with complaints of left calf pain for the past 2 days.  She recently was in a car for a long extended period of time.  She denies blood thinner use.  She reports pain has gradually worsened.  She called her doctor and was told to come to the ER for ultrasound to rule out DVT.    AAOx3, respirations even and non- labored, stable vitals, normal coloration of skin, sitting upright in triage chair, appears in no acute distress.          ORDERS  Labs Reviewed - No data to display    ED Orders (720h ago, onward)      None              Virtual Visit Note: The provider triage portion of this emergency department evaluation and documentation was performed via [employer], a HIPAA-compliant telemedicine application, in concert with a tele-presenter in the room. A face to face patient evaluation with one of my colleagues will occur once the patient is placed in an emergency department room.      DISCLAIMER: This note was prepared with [employer] voice recognition transcription software. Garbled syntax, mangled pronouns, and other bizarre constructions may be attributed to that software system.

## 2024-01-27 NOTE — DISCHARGE INSTRUCTIONS
Use anti-inflammatory medications such as ibuprofen or naproxen to help with pain and inflammation.  Wear a compression stocking to help with pain and inflammation - these are available at [employer], [employer], or other pharmacies.  Use either warm or cool compresses to help with pain and inflammation, keep leg elevated when possible.  Follow-up with your primary care doctor for further follow-up, but your vein has very low risk for progression to a more serious process.

## 2024-01-27 NOTE — ED TRIAGE NOTES
"Laura Smith, an 78 y.o. female presents to the ED with left calf pain after recent road trip. States she has L calf tenderness, "a lump" on the back of her calf. Denies chest pain, shortness of breath      Chief Complaint   Patient presents with    Leg Pain     Reports pain to the L calf onset 2 days ago. Pt recently returned from a trip where she drove a total of 12 hours round trip. Denies blood thinners use. Reports tenderness to area, no redness noted. Sent by MD for evaluation for blood clot.      Review of patient's allergies indicates:   Allergen Reactions    Demerol [meperidine] Nausea And Vomiting     Past Medical History:   Diagnosis Date    Allergy     Arthritis     Depression     Lumbar disc disease     PONV (postoperative nausea and vomiting)     Thyroid disease        "

## 2024-03-21 ENCOUNTER — TELEPHONE (OUTPATIENT)
Dept: OBSTETRICS AND GYNECOLOGY | Facility: CLINIC | Age: 79
End: 2024-03-21
Payer: MEDICARE

## 2024-03-21 DIAGNOSIS — Z12.31 VISIT FOR SCREENING MAMMOGRAM: Primary | ICD-10-CM

## 2024-03-21 NOTE — TELEPHONE ENCOUNTER
----- Message from Nallely Mccall sent at 3/21/2024  8:05 AM CDT -----  Regarding: Patient Advice              Name of Who is Calling:  Laura Smith    Who Left The Message:  Laura Maria Ines Smith          What is the request in detail:      Patient called requesting to schedule her annual mammogram; please put the orders in at your earliest convenience and  give the patient a call back to schedule .   Thank you      Can the clinic reply by [facility]: NO      What Number to Call Back if not in [facility]:  [phone] (m)

## 2024-03-22 ENCOUNTER — TELEPHONE (OUTPATIENT)
Dept: OBSTETRICS AND GYNECOLOGY | Facility: CLINIC | Age: 79
End: 2024-03-22
Payer: MEDICARE

## 2024-04-30 ENCOUNTER — HOSPITAL ENCOUNTER (OUTPATIENT)
Dept: RADIOLOGY | Facility: OTHER | Age: 79
Discharge: HOME | End: 2024-04-30
Attending: OBSTETRICS & GYNECOLOGY
Payer: MEDICARE

## 2024-04-30 DIAGNOSIS — Z12.31 VISIT FOR SCREENING MAMMOGRAM: ICD-10-CM

## 2024-04-30 PROCEDURE — 77067 SCR MAMMO BI INCL CAD: CPT | Mod: TC

## 2024-04-30 PROCEDURE — 77067 SCR MAMMO BI INCL CAD: CPT | Mod: 26,,, | Performed by: RADIOLOGY

## 2024-04-30 PROCEDURE — 77063 BREAST TOMOSYNTHESIS BI: CPT | Mod: TC

## 2024-04-30 PROCEDURE — 77063 BREAST TOMOSYNTHESIS BI: CPT | Mod: 26,,, | Performed by: RADIOLOGY

## 2024-05-30 ENCOUNTER — OFFICE VISIT (OUTPATIENT)
Dept: OBSTETRICS AND GYNECOLOGY | Facility: CLINIC | Age: 79
End: 2024-05-30
Payer: MEDICARE

## 2024-05-30 DIAGNOSIS — Z01.411 ENCOUNTER FOR GYNECOLOGICAL EXAMINATION WITH ABNORMAL FINDING: Primary | ICD-10-CM

## 2024-05-30 DIAGNOSIS — N95.2 VAGINAL ATROPHY: ICD-10-CM

## 2024-05-30 DIAGNOSIS — Z12.31 BREAST CANCER SCREENING BY MAMMOGRAM: ICD-10-CM

## 2024-05-30 PROCEDURE — G0101 CA SCREEN;PELVIC/BREAST EXAM: HCPCS | Mod: PBBFAC | Performed by: OBSTETRICS & GYNECOLOGY

## 2024-05-30 PROCEDURE — 99212 OFFICE O/P EST SF 10 MIN: CPT | Mod: PBBFAC,25 | Performed by: OBSTETRICS & GYNECOLOGY

## 2024-05-30 PROCEDURE — G0101 CA SCREEN;PELVIC/BREAST EXAM: HCPCS | Mod: S$PBB,,, | Performed by: OBSTETRICS & GYNECOLOGY

## 2024-05-30 PROCEDURE — 99999 PR PBB SHADOW E&M-EST. PATIENT-LVL II: CPT | Mod: PBBFAC,,, | Performed by: OBSTETRICS & GYNECOLOGY

## 2024-05-30 NOTE — PROGRESS NOTES
Medicare breast and pelvic exam    Laura Smith  is a 79 y.o. year old  patient who presents for a Medicare breast and pelvic exam.  She is postmenopausal.  Patient denies menopausal bleeding.  No gynecologic complaints today.    Past Medical History:   Diagnosis Date    Allergy     Arthritis     Depression     Lumbar disc disease     PONV (postoperative nausea and vomiting)     Thyroid disease        Past Surgical History:   Procedure Laterality Date    DILATION AND CURETTAGE OF UTERUS  2016    FRACTURE SURGERY      Right wrist, long ago     HYSTEROSCOPY  2016    JOINT REPLACEMENT      right TKA    KNEE SURGERY Right     X 2 2014 & 2015    LASER LAPAROSCOPY      age 36    SHOULDER SURGERY       right X 1 2005 left X 2 2006 & unkl     TONSILLECTOMY      as a child, 7 y/o     WRIST SURGERY Right        OB History          0    Para   0    Term   0       0    AB   0    Living   0         SAB   0    IAB   0    Ectopic   0    Multiple   0    Live Births                       ROS:  GENERAL: Feeling well overall.   SKIN: Denies rash or lesions.   HEAD: Denies head injury or headache.   NODES: Denies enlarged lymph nodes.   CHEST: Denies chest pain or shortness of breath.   CARDIOVASCULAR: Denies palpitations or left sided chest pain.   ABDOMEN: No abdominal pain, nausea, vomiting or rectal bleeding.   URINARY: No dysuria, hematuria, or burning on urination.  REPRODUCTIVE: See HPI.   BREASTS: Denies pain, lumps, or nipple discharge.   HEMATOLOGIC: No easy bruisability or excessive bleeding.   MUSCULOSKELETAL: Denies joint pain or swelling.   NEUROLOGIC: Denies syncope or weakness.   PSYCHIATRIC: Denies depression.    PE:   APPEARANCE: Well nourished, well developed, in no acute distress.  NECK: Neck symmetric without masses or thyromegaly.  BREASTS: Symmetrical, no skin changes or visible lesions. No palpable masses, nipple discharge or adenopathy bilaterally.  ABDOMEN: Soft. No  tenderness or masses. No hernias. No CVA tenderness.  VULVA: No lesions. Normal female genitalia.  URETHRAL MEATUS: Normal size and location, no lesions, no prolapse.  URETHRA: No masses, tenderness, prolapse or scarring.  VAGINA: Atrophic.  No lesions, no discharge, no significant cystocele or rectocele.  CERVIX: No lesions and discharge.   UTERUS: Normal size, regular shape, mobile, non-tender, bladder base nontender.  ADNEXA: No masses, tenderness or CDS nodularity.  ANUS PERINEUM: Normal.    Diagnosis:  1. Encounter for gynecological examination with abnormal finding    2. Vaginal atrophy          PLAN:  Age specific counseling    Patient was counseled today on postmenopausal issues.    Patient is up-to-date with colorectal cancer screening with [employer].  Patient is up-to-date with breast cancer screening.  Screening mammogram ordered today.    Follow up in about 2 years (around 5/30/2026) for Medicare breast and pelvic exam.    Maynor Givens IV, MD

## 2024-07-21 PROBLEM — F41.9 ANXIETY AND DEPRESSION: Chronic | Status: ACTIVE | Noted: 2024-01-01

## 2024-07-21 PROBLEM — F32.A ANXIETY AND DEPRESSION: Status: ACTIVE | Noted: 2024-01-01

## 2024-07-21 PROBLEM — K21.9 GERD (GASTROESOPHAGEAL REFLUX DISEASE): Status: ACTIVE | Noted: 2024-01-01

## 2024-07-21 PROBLEM — F41.9 ANXIETY AND DEPRESSION: Status: ACTIVE | Noted: 2024-01-01

## 2024-07-21 PROBLEM — K21.9 GERD (GASTROESOPHAGEAL REFLUX DISEASE): Chronic | Status: ACTIVE | Noted: 2024-01-01

## 2024-07-21 PROBLEM — R29.6 RECURRENT FALLS: Status: ACTIVE | Noted: 2024-01-01

## 2024-07-21 PROBLEM — F32.A ANXIETY AND DEPRESSION: Chronic | Status: ACTIVE | Noted: 2024-01-01

## 2024-07-21 PROBLEM — S72.142A CLOSED COMMINUTED INTERTROCHANTERIC FRACTURE OF LEFT FEMUR: Status: ACTIVE | Noted: 2024-01-01

## 2024-07-21 PROBLEM — M25.69 DECREASED RANGE OF MOTION OF TRUNK AND BACK: Status: RESOLVED | Noted: 2023-01-01 | Resolved: 2024-01-01

## 2024-07-21 PROBLEM — R29.898 DECREASED STRENGTH OF TRUNK AND BACK: Status: RESOLVED | Noted: 2023-01-01 | Resolved: 2024-01-01
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
## 2024-07-21 NOTE — PLAN OF CARE
Pt is AAOx3.  Independent at baseline.  DME includes a walker.  Family will transport pt to home when discharged.  PCP is correct on face sheet.     07/21/24 6026   Discharge Assessment   Assessment Type Discharge Planning Assessment   Confirmed/corrected address, phone number and insurance Yes   Confirmed Demographics Correct on Facesheet   Source of Information patient   Communicated FELICIA with patient/caregiver Date not available/Unable to determine   Reason For Admission Closed comminuted intertrochanteric fracture of left femur   People in Home alone   Do you expect to return to your current living situation? Yes   Do you have help at home or someone to help you manage your care at home? No   Prior to hospitilization cognitive status: Alert/Oriented   Current cognitive status: Alert/Oriented   Walking or Climbing Stairs Difficulty yes   Walking or Climbing Stairs ambulation difficulty, requires equipment   Dressing/Bathing Difficulty no   Equipment Currently Used at Home walker, standard   Readmission within 30 days? No   Patient currently being followed by outpatient case management? No   Do you currently have service(s) that help you manage your care at home? No   Do you take prescription medications? Yes   Do you have prescription coverage? Yes   Do you have any problems affording any of your prescribed medications? No   Is the patient taking medications as prescribed? yes   Who is going to help you get home at discharge? daughter   How do you get to doctors appointments? car, drives self;family or friend will provide   Are you on dialysis? No   Do you take coumadin? No   Discharge Plan A Home with family   Discharge Plan B Home   DME Needed Upon Discharge  none   Discharge Plan discussed with: Patient   Transition of Care Barriers None   Physical Activity   On average, how many days per week do you engage in moderate to strenuous exercise (like a brisk walk)? 0 days   On average, how many minutes do you  engage in exercise at this level? 0 min   Financial Resource Strain   How hard is it for you to pay for the very basics like food, housing, medical care, and heating? Not hard   Housing Stability   In the last 12 months, was there a time when you were not able to pay the mortgage or rent on time? N   At any time in the past 12 months, were you homeless or living in a shelter (including now)? N   Transportation Needs   Has the lack of transportation kept you from medical appointments, meetings, work or from getting things needed for daily living? No   Food Insecurity   Within the past 12 months, you worried that your food would run out before you got the money to buy more. Never true   Within the past 12 months, the food you bought just didn't last and you didn't have money to get more. Never true   Stress   Do you feel stress - tense, restless, nervous, or anxious, or unable to sleep at night because your mind is troubled all the time - these days? Rather much   Social Isolation   How often do you feel lonely or isolated from those around you?  Never   Alcohol Use   Q1: How often do you have a drink containing alcohol? 4 or more ti   Q2: How many drinks containing alcohol do you have on a typical day when you are drinking? 1 or 2   Q3: How often do you have six or more drinks on one occasion? Never   Utilities   In the past 12 months has the electric, gas, oil, or water company threatened to shut off services in your home? No   Health Literacy   How often do you need to have someone help you when you read instructions, pamphlets, or other written material from your doctor or pharmacy? Never     [facility] ([address])  Initial Discharge Assessment       Primary Care Provider: Ramon Lovell MD    Admission Diagnosis: Hip fracture [S72.009A]  Fall [W19.XXXA]    Admission Date: 7/21/2024  Expected Discharge Date:     Transition of Care Barriers: (P) None    Payor: MEDICARE / Plan: MEDICARE PART A & B /  Product Type: Government /     Extended Emergency Contact Information  Primary Emergency Contact: Marco Smithter  Address: [address]           [place] [address] [facility]  Home Phone: [phone]  Mobile Phone: [phone]  Relation: Spouse  Secondary Emergency Contact: Alyx Smith  Address: [address] [place] [address] [facility]  Home Phone: [phone]  Mobile Phone: [phone]  Relation: Daughter    Discharge Plan A: (P) Home with family  Discharge Plan B: (P) Home      [id number] - [place], [place] - [address]  [address] [facility] [address]  Phone: [phone] Fax: [phone]    [facility] - [place], [place] - [address]  [address] [address]  Phone: [phone] Fax: [phone]    [facility] - [place], [place] - [address]  [address] [place] [address]  Phone: [phone] Fax: [phone]    [id number] [place], [place] - [address]  [address]  [facility] [address]  Phone: [phone] Fax: [phone]    [facility] - [place], [place] - [address] [facility]  [address] [facility] [address]  Phone: [phone] Fax: [phone]      Initial Assessment (most recent)       Adult Discharge Assessment - 07/21/24 1456          Discharge Assessment    Assessment Type Discharge Planning Assessment (P)      Confirmed/corrected address, phone number and insurance Yes (P)      Confirmed Demographics Correct on Facesheet (P)      Source of Information patient (P)      Communicated FELICIA with patient/caregiver Date not available/Unable to determine (P)      Reason For Admission Closed comminuted intertrochanteric fracture of left femur (P)      People in Home alone (P)      Do you expect to return to your current living  situation? Yes (P)      Do you have help at home or someone to help you manage your care at home? No (P)      Prior to hospitilization cognitive status: Alert/Oriented (P)      Current cognitive status: Alert/Oriented (P)      Walking or Climbing Stairs Difficulty yes (P)      Walking or Climbing Stairs ambulation difficulty, requires equipment (P)      Dressing/Bathing Difficulty no (P)      Equipment Currently Used at Home walker, standard (P)      Readmission within 30 days? No (P)      Patient currently being followed by outpatient case management? No (P)      Do you currently have service(s) that help you manage your care at home? No (P)      Do you take prescription medications? Yes (P)      Do you have prescription coverage? Yes (P)      Do you have any problems affording any of your prescribed medications? No (P)      Is the patient taking medications as prescribed? yes (P)      Who is going to help you get home at discharge? daughter (P)      How do you get to doctors appointments? car, drives self;family or friend will provide (P)      Are you on dialysis? No (P)      Do you take coumadin? No (P)      Discharge Plan A Home with family (P)      Discharge Plan B Home (P)      DME Needed Upon Discharge  none (P)      Discharge Plan discussed with: Patient (P)      Transition of Care Barriers None (P)         Physical Activity    On average, how many days per week do you engage in moderate to strenuous exercise (like a brisk walk)? 0 days (P)      On average, how many minutes do you engage in exercise at this level? 0 min (P)         Financial Resource Strain    How hard is it for you to pay for the very basics like food, housing, medical care, and heating? Not hard at all (P)         Housing Stability    In the last 12 months, was there a time when you were not able to pay the mortgage or rent on time? No (P)      At any time in the past 12 months, were you homeless or living in a shelter (including now)? No  (P)         Transportation Needs    Has the lack of transportation kept you from medical appointments, meetings, work or from getting things needed for daily living? No (P)         Food Insecurity    Within the past 12 months, you worried that your food would run out before you got the money to buy more. Never true (P)      Within the past 12 months, the food you bought just didn't last and you didn't have money to get more. Never true (P)         Stress    Do you feel stress - tense, restless, nervous, or anxious, or unable to sleep at night because your mind is troubled all the time - these days? Rather much (P)         Social Isolation    How often do you feel lonely or isolated from those around you?  Never (P)         Alcohol Use    Q1: How often do you have a drink containing alcohol? 4 or more times a week (P)      Q2: How many drinks containing alcohol do you have on a typical day when you are drinking? 1 or 2 (P)      Q3: How often do you have six or more drinks on one occasion? Never (P)         Utilities    In the past 12 months has the electric, gas, oil, or water company threatened to shut off services in your home? No (P)         Health Literacy    How often do you need to have someone help you when you read instructions, pamphlets, or other written material from your doctor or pharmacy? Never (P)
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
## 2024-07-21 NOTE — SUBJECTIVE & OBJECTIVE
Past Medical History:   Diagnosis Date    Allergy     Arthritis     Depression     Lumbar disc disease     PONV (postoperative nausea and vomiting)     Thyroid disease        Past Surgical History:   Procedure Laterality Date    DILATION AND CURETTAGE OF UTERUS  07/11/2016    FRACTURE SURGERY      Right wrist, long ago     HYSTEROSCOPY  07/11/2016    JOINT REPLACEMENT      right TKA    KNEE SURGERY Right     X 2 2014 & 2015    LASER LAPAROSCOPY      age 36    SHOULDER SURGERY       right X 1 2005 left X 2 2006 & unkl     TONSILLECTOMY      as a child, 5 y/o     WRIST SURGERY Right        Review of patient's allergies indicates:   Allergen Reactions    Demerol [meperidine] Nausea And Vomiting       No current facility-administered medications on file prior to encounter.     Current Outpatient Medications on File Prior to Encounter   Medication Sig    aripiprazole (ABILIFY) 2 MG Tab     dextroamphetamine-amphetamine (ADDERALL) 15 mg tablet Take 15 mg by mouth once daily.    EScitalopram oxalate (LEXAPRO) 20 MG tablet Take 2 tablets (40 mg total) by mouth once daily.    ferrous sulfate (FEOSOL) Tab tablet Take 1 tablet by mouth once daily.    levothyroxine (EUTHYROX) 75 MCG tablet Take 1 tablet by mouth once daily    LORazepam (ATIVAN) 2 MG Tab Take 1 tablet (2 mg total) by mouth 2 (two) times daily as needed.    magnesium 30 mg Tab Take by mouth once.    MULTIVITAMIN W-MINERALS/LUTEIN (CENTRUM SILVER ORAL) Take 1 tablet by mouth once daily.    pantoprazole (PROTONIX) 40 MG tablet Take 1 tablet by mouth twice daily    [DISCONTINUED] esomeprazole (NEXIUM) 40 MG capsule Take 40 mg by mouth 2 (two) times daily.    ibuprofen (ADVIL,MOTRIN) 600 MG tablet Take 1 tablet (600 mg total) by mouth every 6 (six) hours as needed for Pain.    [DISCONTINUED] dextroamphetamine-amphetamine (ADDERALL) 10 mg Tab Take 1.5 tablets (15 mg total) by mouth Daily.    [DISCONTINUED] famotidine (PEPCID) 40 MG tablet TAKE 1 TABLET BY MOUTH DAILY  AS NEEDED (HEARTBURN)     Family History    Does not know genetic parental medical history; adopted.       Tobacco Use    Smoking status: Former    Smokeless tobacco: Never   Substance and Sexual Activity    Alcohol use: Yes     Comment: pony beer daily, martini weekend     Drug use: No    Sexual activity: Not Currently     Partners: Male     Comment:  Unruly     Review of Systems   Constitutional:  Negative for chills and fever.   HENT:  Negative for sore throat and trouble swallowing.    Eyes:  Negative for pain and visual disturbance.   Respiratory:  Negative for cough and shortness of breath.    Cardiovascular:  Negative for chest pain and palpitations.   Gastrointestinal:  Negative for abdominal pain, nausea and vomiting.   Genitourinary:  Negative for difficulty urinating and dysuria.   Musculoskeletal:  Positive for arthralgias and myalgias.   Skin:  Negative for rash and wound.   Neurological:  Negative for weakness and numbness.     Objective:     Vital Signs (Most Recent):  Temp: 97.6 °F (36.4 °C) (07/21/24 1354)  Pulse: 67 (07/21/24 1354)  Resp: 18 (07/21/24 1545)  BP: 132/61 (07/21/24 1354)  SpO2: 95 % (07/21/24 1354) Vital Signs (24h Range):  Temp:  [97.6 °F (36.4 °C)-97.7 °F (36.5 °C)] 97.6 °F (36.4 °C)  Pulse:  [61-67] 67  Resp:  [16-20] 18  SpO2:  [92 %-96 %] 95 %  BP: (132-149)/(61-71) 132/61     Weight: 71.7 kg (158 lb)  Body mass index is 26.29 kg/m².     Physical Exam  Vitals and nursing note reviewed.   Constitutional:       General: She is not in acute distress.     Appearance: She is well-developed.   HENT:      Head: Normocephalic and atraumatic.   Eyes:      General:         Right eye: No discharge.         Left eye: No discharge.      Conjunctiva/sclera: Conjunctivae normal.   Cardiovascular:      Rate and Rhythm: Normal rate.      Pulses: Normal pulses.   Pulmonary:      Effort: Pulmonary effort is normal. No respiratory distress.   Abdominal:      Palpations: Abdomen is soft.       Tenderness: There is no abdominal tenderness.   Musculoskeletal:      Right lower leg: No edema.      Left lower leg: No edema.      Comments: LLE internally rotated and shortened.   Skin:     General: Skin is warm and dry.   Neurological:      Mental Status: She is alert and oriented to person, place, and time.        Significant Labs:   CBC:  Recent Labs   Lab 07/21/24  1151   WBC 6.30   HGB 13.6   HCT 41.4      GRAN 62.3  3.9   LYMPH 27.8  1.8   MONO 7.8  0.5   EOS 0.1   BASO 0.03   CMP:  Recent Labs   Lab 07/21/24  1151      K 4.6      CO2 21*   BUN 19   CREATININE 0.8   *   CALCIUM 9.1   ALKPHOS 114   AST 25   ALT 18   BILITOT 0.3   PROT 7.0   ALBUMIN 3.7   ANIONGAP 13      Significant Imaging: I have reviewed and interpreted all pertinent imaging results/findings within the past 24 hours.  Imaging Results               X-Ray Hip 2 or 3 views Left with Pelvis when performed (Final result)  Result time 07/21/24 11:36:05      Final result by Tino Spencer MD (07/21/24 11:36:05)                   Impression:      Angulated comminuted left intertrochanteric fracture.      Electronically signed by: Tino Spencer MD  Date:    07/21/2024  Time:    11:36               Narrative:    EXAMINATION:  XR HIP WITH PELVIS WHEN PERFORMED 2 OR 3 VIEWS LEFT    CLINICAL HISTORY:  Unspecified fall, initial encounter    TECHNIQUE:  AP view of the pelvis and frog leg lateral view of the left hip were performed.    COMPARISON:  None    FINDINGS:  There is a comminuted intertrochanteric fracture on the left with lateral angulation.  Underlying demineralization is present.  There is suggestion of chondrocalcinosis and osteophyte formation in both hips suggesting CPPD.  The bony pelvis appears intact. This report was flagged in Epic as abnormal.                                       CT Cervical Spine Without Contrast (Final result)  Result time 07/21/24 11:31:22      Final result by Tino Spencer,  MD (07/21/24 11:31:22)                   Impression:      No definite acute fracture is seen in the cervical  region in this patient with moderate spondylosis as is described above..    There is asymmetric fullness to the left of midline at the level of the epiglottis.  Whether this relates to a mucosal lesion or this date of distension is uncertain.  Clinical correlation is advised.      Electronically signed by: Tino Spencer MD  Date:    07/21/2024  Time:    11:31               Narrative:    EXAMINATION:  CT CERVICAL SPINE WITHOUT CONTRAST    CLINICAL HISTORY:  fall;    TECHNIQUE:  Low dose axial images, sagittal and coronal reformations were performed though the cervical spine.  Contrast was not administered.    COMPARISON:  Prior report without images of from [facility] dated June 29, 2016    FINDINGS:  There is a minimal anterior subluxation of C4 relative to C5 measuring 2 mm.  There is a minimal anterior subluxation of C7 relative to T1 measuring 2 mm.  There is no evidence of fracture in the cervical region.  There is loss of disc space height most prominent at C5/C6 and C6/C7.  Degenerative changes are seen about the atlantoaxial ligament with mild distortion of the upper canal.  There is asymmetric fullness to the left of midline at the level of the epiglottis.  A mass lesion cannot be excluded and consideration of clinical follow-up is advised.  The individual disc levels appears follows:    C2/C3: Mild uncovertebral induced neural foraminal narrowing is noted bilaterally.  The central canal is intact.    C3/C4: Mild right greater than left-sided uncovertebral and facet induced neural foraminal narrowing is noted.  There is no evidence of significant canal stenosis.    C4/C5: Moderate uncovertebral and facet induced neural foraminal narrowing is noted bilaterally.  There is mild posterior osteophyte and disc complex with mildly efface the anterior thecal sac.    C5/C6: Moderate left and mild  right-sided uncovertebral and facet induced neural foraminal narrowing are noted.  There is left paracentral disc osteophyte complex producing some distortion left anterolateral canal.    C6/C7: Moderate uncovertebral and facet induced neural foraminal narrowing is noted bilaterally.  Disc material and osteophyte formation cause mild distortion of the central canal.    C7/T1: Degenerative facet changes are noted and there is mild narrowing of the canal both foramina.                                       CT Head Without Contrast (Final result)  Result time 07/21/24 11:21:12      Final result by Tino Spencer MD (07/21/24 11:21:12)                   Impression:      Mild frontal lobe predominant cerebral atrophy.    Probable prior lacunar infarcts in the right basal ganglia.    No evidence of acute infarct mass effect or abnormal extra-axial collection.    Mild right anterior ethmoid sinus mucosal thickening      Electronically signed by: Tino Spencer MD  Date:    07/21/2024  Time:    11:21               Narrative:    EXAMINATION:  CT HEAD WITHOUT CONTRAST    CLINICAL HISTORY:  fall;    TECHNIQUE:  Low dose axial images were obtained through the head.  Coronal and sagittal reformations were also performed. Contrast was not administered.    COMPARISON:  None.    FINDINGS:  There is evidence of intracranial atherosclerosis.  There is minimal mucosal thickening in the right anterior ethmoid air cell.  There is a well-defined low density in the right caudate head and questionably within the right thalamus x2.  This would suggest prior areas of lacunar infarction.  Brain and ventricles are otherwise unremarkable.  There is no evidence of mass effect or midline shift.  There is mild frontal lobe predominant cerebral atrophy.

## 2024-07-21 NOTE — ED PROVIDER NOTES
Encounter Date: 7/21/2024       History     Chief Complaint   Patient presents with    Hip Pain     Pt. Had a fall 20 minutes ago.Pt. landed on her left side. Pt. Is complaining of left hip pain. Pt reports also hitting her head. NO LOC,denies blood thinners. Pt. Is alert and ABC's are intact.     Pt is a 79 year old female with PMHx significant for lumbar disc disease and thyroid disease who presents for evaluation of left hip pain, which began 20 minutes prior to arrival in the ED after a mechanical slip and fall. Pt reports being startled by someone that walked behind her. She turned around quickly and fell, landing on her left side. Struck her head and left hip upon falling. Denies any LOC, confusion, nausea/vomiting. She has not been ambulatory since the fall.     The history is provided by the patient.     Review of patient's allergies indicates:   Allergen Reactions    Demerol [meperidine] Nausea And Vomiting     Past Medical History:   Diagnosis Date    Allergy     Arthritis     Depression     Lumbar disc disease     PONV (postoperative nausea and vomiting)     Thyroid disease      Past Surgical History:   Procedure Laterality Date    DILATION AND CURETTAGE OF UTERUS  07/11/2016    FRACTURE SURGERY      Right wrist, long ago     HYSTEROSCOPY  07/11/2016    JOINT REPLACEMENT      right TKA    KNEE SURGERY Right     X 2 2014 & 2015    LASER LAPAROSCOPY      age 36    SHOULDER SURGERY       right X 1 2005 left X 2 2006 & unkl     TONSILLECTOMY      as a child, 7 y/o     WRIST SURGERY Right      Family History   Adopted: Yes   Problem Relation Name Age of Onset    Breast cancer Neg Hx      Colon cancer Neg Hx      Ovarian cancer Neg Hx       Social History     Tobacco Use    Smoking status: Former    Smokeless tobacco: Never   Substance Use Topics    Alcohol use: Yes     Comment: pony beer daily, martini weekend     Drug use: No     Review of Systems    Physical Exam     Initial Vitals [07/21/24 1046]   BP Pulse  Resp Temp SpO2   137/71 63 20 97.7 °F (36.5 °C) (!) 92 %      MAP       --         Physical Exam    Nursing note and vitals reviewed.  Constitutional: She appears well-developed and well-nourished. No distress.   HENT:   Head: Normocephalic and atraumatic.   Eyes: EOM are normal. Pupils are equal, round, and reactive to light.   Neck: Neck supple. No tracheal deviation present.   Cardiovascular:  Normal rate, regular rhythm and intact distal pulses.           No murmur heard.  Pulmonary/Chest: Breath sounds normal. No stridor. No respiratory distress. She has no wheezes. She has no rales.   Abdominal: Abdomen is soft. She exhibits no distension. There is no abdominal tenderness.   Musculoskeletal:      Cervical back: Neck supple.      Comments: No C, T, or L spine tenderness to palpation. FROM to the upper extremities bilaterally without tenderness or deformity. FROM to the right hip without tenderness or deformity. Left hip tenderness to palpation with decreased ROM 2/2 pain, intact pulses and sensation       Neurological: She is alert and oriented to person, place, and time. She has normal strength. No sensory deficit.   Skin: Skin is warm and dry. Capillary refill takes less than 2 seconds.         ED Course   Procedures  Labs Reviewed   COMPREHENSIVE METABOLIC PANEL - Abnormal       Result Value    Sodium 138      Potassium 4.6      Chloride 104      CO2 21 (*)     Glucose 116 (*)     BUN 19      Creatinine 0.8      Calcium 9.1      Total Protein 7.0      Albumin 3.7      Total Bilirubin 0.3      Alkaline Phosphatase 114      AST 25      ALT 18      eGFR >60      Anion Gap 13     CBC W/ AUTO DIFFERENTIAL    WBC 6.30      RBC 4.44      Hemoglobin 13.6      Hematocrit 41.4      MCV 93      MCH 30.6      MCHC 32.9      RDW 14.5      Platelets 325      MPV 10.0      Immature Granulocytes 0.3      Gran # (ANC) 3.9      Immature Grans (Abs) 0.02      Lymph # 1.8      Mono # 0.5      Eos # 0.1      Baso # 0.03       nRBC 0      Gran % 62.3      Lymph % 27.8      Mono % 7.8      Eosinophil % 1.3      Basophil % 0.5      Differential Method Automated     PROTIME-INR    Prothrombin Time 11.4      INR 1.0     TYPE & SCREEN          Imaging Results               X-Ray Hip 2 or 3 views Left with Pelvis when performed (Final result)  Result time 07/21/24 11:36:05      Final result by Tino Spencer MD (07/21/24 11:36:05)                   Impression:      Angulated comminuted left intertrochanteric fracture.      Electronically signed by: Tino Spencer MD  Date:    07/21/2024  Time:    11:36               Narrative:    EXAMINATION:  XR HIP WITH PELVIS WHEN PERFORMED 2 OR 3 VIEWS LEFT    CLINICAL HISTORY:  Unspecified fall, initial encounter    TECHNIQUE:  AP view of the pelvis and frog leg lateral view of the left hip were performed.    COMPARISON:  None    FINDINGS:  There is a comminuted intertrochanteric fracture on the left with lateral angulation.  Underlying demineralization is present.  There is suggestion of chondrocalcinosis and osteophyte formation in both hips suggesting CPPD.  The bony pelvis appears intact. This report was flagged in Epic as abnormal.                                       CT Cervical Spine Without Contrast (Final result)  Result time 07/21/24 11:31:22      Final result by Tino Spencer MD (07/21/24 11:31:22)                   Impression:      No definite acute fracture is seen in the cervical  region in this patient with moderate spondylosis as is described above..    There is asymmetric fullness to the left of midline at the level of the epiglottis.  Whether this relates to a mucosal lesion or this date of distension is uncertain.  Clinical correlation is advised.      Electronically signed by: Tino Spencer MD  Date:    07/21/2024  Time:    11:31               Narrative:    EXAMINATION:  CT CERVICAL SPINE WITHOUT CONTRAST    CLINICAL HISTORY:  fall;    TECHNIQUE:  Low dose axial images,  sagittal and coronal reformations were performed though the cervical spine.  Contrast was not administered.    COMPARISON:  Prior report without images of from [facility] dated June 29, 2016    FINDINGS:  There is a minimal anterior subluxation of C4 relative to C5 measuring 2 mm.  There is a minimal anterior subluxation of C7 relative to T1 measuring 2 mm.  There is no evidence of fracture in the cervical region.  There is loss of disc space height most prominent at C5/C6 and C6/C7.  Degenerative changes are seen about the atlantoaxial ligament with mild distortion of the upper canal.  There is asymmetric fullness to the left of midline at the level of the epiglottis.  A mass lesion cannot be excluded and consideration of clinical follow-up is advised.  The individual disc levels appears follows:    C2/C3: Mild uncovertebral induced neural foraminal narrowing is noted bilaterally.  The central canal is intact.    C3/C4: Mild right greater than left-sided uncovertebral and facet induced neural foraminal narrowing is noted.  There is no evidence of significant canal stenosis.    C4/C5: Moderate uncovertebral and facet induced neural foraminal narrowing is noted bilaterally.  There is mild posterior osteophyte and disc complex with mildly efface the anterior thecal sac.    C5/C6: Moderate left and mild right-sided uncovertebral and facet induced neural foraminal narrowing are noted.  There is left paracentral disc osteophyte complex producing some distortion left anterolateral canal.    C6/C7: Moderate uncovertebral and facet induced neural foraminal narrowing is noted bilaterally.  Disc material and osteophyte formation cause mild distortion of the central canal.    C7/T1: Degenerative facet changes are noted and there is mild narrowing of the canal both foramina.                                       CT Head Without Contrast (Final result)  Result time 07/21/24 11:21:12      Final result by Tino Spencer MD  (07/21/24 11:21:12)                   Impression:      Mild frontal lobe predominant cerebral atrophy.    Probable prior lacunar infarcts in the right basal ganglia.    No evidence of acute infarct mass effect or abnormal extra-axial collection.    Mild right anterior ethmoid sinus mucosal thickening      Electronically signed by: Tino Spencer MD  Date:    07/21/2024  Time:    11:21               Narrative:    EXAMINATION:  CT HEAD WITHOUT CONTRAST    CLINICAL HISTORY:  fall;    TECHNIQUE:  Low dose axial images were obtained through the head.  Coronal and sagittal reformations were also performed. Contrast was not administered.    COMPARISON:  None.    FINDINGS:  There is evidence of intracranial atherosclerosis.  There is minimal mucosal thickening in the right anterior ethmoid air cell.  There is a well-defined low density in the right caudate head and questionably within the right thalamus x2.  This would suggest prior areas of lacunar infarction.  Brain and ventricles are otherwise unremarkable.  There is no evidence of mass effect or midline shift.  There is mild frontal lobe predominant cerebral atrophy.                                       Medications   sodium chloride 0.9% flush 10 mL (has no administration in time range)   melatonin tablet 6 mg (has no administration in time range)   ondansetron disintegrating tablet 4 mg (has no administration in time range)   ondansetron injection 4 mg (has no administration in time range)   acetaminophen tablet 650 mg (has no administration in time range)   morphine injection 4 mg (4 mg Intravenous Given 7/21/24 1103)     Medical Decision Making  Pt presents for fall. Ddx: fracture, dislocation, SDH, EDH, ICH. Pt with significant tenderness and decreased ROM to left hip. CT head and neck negative. Hip x-ray with left hip fracture. Injury is closed and neurovascularly intact. Spoke with pt's orthopedist Dr. Worrell. Plan for operative repair. Case discussed with  hospital medicine. Plan for admission for further management and evaluation     Amount and/or Complexity of Data Reviewed  Labs: ordered.  Radiology: ordered.    Risk  Prescription drug management.  Decision regarding hospitalization.                                      Clinical Impression:  Final diagnoses:  [W19.XXXA] Fall  [S72.009A] Hip fracture          ED Disposition Condition    Admit                 Rafy Rawls Jr., MD  Resident  07/21/24 0456

## 2024-07-21 NOTE — NURSING
12:21 PM  Care handoff received from RACHEL Ingram. Emergency medicine provider consulted Dr. Worrell prior to arrival to floor.     2:00 PM  Admitted to floor from ED.     6:04 PM  AAOX4. 4 eyes completed. NADN. Call bell w/in reach  and safety rounds completed.

## 2024-07-21 NOTE — PLAN OF CARE
07/21/24 1502   Medicare Message   Important Message from Medicare regarding Discharge Appeal Rights Given to patient/caregiver;Explained to patient/caregiver;Signed/date by patient/caregiver   Date IMM was signed 07/21/24   Time IMM was signed [id number]

## 2024-07-22 NOTE — NURSING
7:04 AM  Pt prepped and ready for surgery. NPO at midnight. CHG bath completed. Undressed and gowned. IV patent. Belongings (jewelery and phone) at bedside. Will go down with glasses to sign any consents.      7:31 AM  MARIAN to surgery    10:15  Returned to floor. VSS on 3L NC. L hip ORIF dressing C/D/I. NADN.     1:39 PM  PT at bedside due to pt decline. Pt found diaphoretic and Agonal breathing. Code Blue initiated.     2:52 PM  Family at bedside. Belongings given to family, except for 3 rings which remain on pt.  and Safia notified of death. Post mortem care completed.

## 2024-07-22 NOTE — PLAN OF CARE
[facility] - Med [facility]urg ([address])  Discharge Final Note    Primary Care Provider: Ramon Lovell MD    Expected Discharge Date: 24    Final Discharge Note (most recent)       Final Note - 24 1451          Final Note    Assessment Type Final Discharge Note     Anticipated Discharge Disposition                      Important Message from Medicare  Important Message from Medicare regarding Discharge Appeal Rights: Given to patient/caregiver, Explained to patient/caregiver, Signed/date by patient/caregiver     Date IMM was signed: 24  Time IMM was signed: [id number]

## 2024-07-22 NOTE — OP NOTE
[facility] ([address]  Surgery Department  Operative Note    SUMMARY     Date of Procedure: 7/22/2024     Procedure: Procedure(s) (LRB):  ORIF, HIP (Left)     Surgeons and Role:     * Jelani Worrell MD - Primary    Assisting Surgeon: None    Pre-Operative Diagnosis: Closed fracture of left hip, initial encounter [S72.002A]    Post-Operative Diagnosis: Post-Op Diagnosis Codes:     * Closed fracture of left hip, initial encounter [S72.002A]    Anesthesia: General/Regional    Operative Findings (including complications, if any):  Left intertrochanteric fracture    Description of Technical Procedures:  Patient underwent spinal anesthesia carefully placed on the fracture table fracture was reduced with visualization C-arm and manipulation.  The left hip was then prepped in usual fashion.  Using a superior lateral approach to the left hip sharp dissection down to the ITB band ITB and gluteus pablito split wire was placed down the center of the femur.  Drilled and reamed appropriately.  Then a 12 TFN nail was placed at the appropriate height.  In the blade was placed into the head of the appropriate length.  Traction was removed so I could impact the fracture.  Made sure the rotation was good locked up top and then locked inferior.  Good configuration of the hardware and fracture.  Instruments then removed.  0 Vicryl was used in ITB band gluteus pablito fascia 0 Vicryl in the deep fatty layer to 0 Vicryl subcuticularly and then Steri-Strips on skin.  She had had a block as well as spinal I just put a little bit of ropivacaine locally.  She is placed in sterile bandage brought to come sterile fashion leg lengths equal rotation symmetric    This performed with a poor recognition system.    Significant Surgical Tasks Conducted by the Assistant(s), if Applicable:  None    Estimated Blood Loss (EBL): * No values recorded between 7/22/2024  8:10 AM and 7/22/2024  8:45 AM * less than 100           Implants:    Implant Name Type Inv. Item Serial No.  Lot No. LRB No. Used Action   NAIL TFNA 12MM 130DEG 170MM ST - [id number]  NAIL TFNA 12MM 130DEG 170MM ST  [employer]  Left 1 Implanted   BLADE HELICAL PERF GOLD 95MM - [id number]  BLADE HELICAL PERF GOLD 95MM  [employer] [id number] Left 1 Implanted   SCREW XL25 IM NAIL 38X5MM - [id number]  SCREW XL25 IM NAIL 38X5MM  [employer] [id number] Left 1 Implanted   WIRE GUIDE 3.2MM 400MM - [id number]  WIRE GUIDE 3.2MM 400MM  [employer]  Left 2 Implanted and Explanted       Specimens:   Specimen (24h ago, onward)      None                    Condition: Good    Disposition: PACU - hemodynamically stable.    Attestation: Op Note Attestation: I was physically present and scrubbed for the entire procedure.

## 2024-07-22 NOTE — ANESTHESIA POSTPROCEDURE EVALUATION
Anesthesia Post Evaluation    Patient: Laura Smith    Procedure(s) Performed: Procedure(s) (LRB):  ORIF, HIP (Left)    Final Anesthesia Type: spinal      Patient location during evaluation: PACU  Patient participation: Yes- Able to Participate  Level of consciousness: awake and alert  Post-procedure vital signs: reviewed and stable  Pain management: adequate  Airway patency: patent    PONV status at discharge: No PONV  Anesthetic complications: no      Cardiovascular status: blood pressure returned to baseline  Respiratory status: unassisted  Hydration status: euvolemic  Follow-up not needed.              Vitals Value Taken Time   /55 07/22/24 1001   Temp 36.3 °C (97.4 °F) 07/22/24 0849   Pulse 81 07/22/24 1007   Resp 16 07/22/24 1000   SpO2 94 % 07/22/24 1007   Vitals shown include unfiled device data.      Event Time   Out of Recovery 07/22/2024 10:11:45         Pain/Gabe Score: Pain Rating Prior to Med Admin: 4 (7/22/2024  9:49 AM)  Pain Rating Post Med Admin: 2 (7/22/2024  4:47 AM)  Gabe Score: 9 (7/22/2024 10:00 AM)

## 2024-07-22 NOTE — PLAN OF CARE
24 1533   Final Note   Assessment Type Final Discharge Note   Anticipated Discharge Disposition    Post-Acute Status   Discharge Delays None known at this time     [facility] ([address])  Discharge Final Note    Primary Care Provider: Ramon Lovell MD    Expected Discharge Date: 2024    Final Discharge Note (most recent)       Final Note - 24 1533          Final Note    Assessment Type Final Discharge Note (P)      Anticipated Discharge Disposition  (P)         Post-Acute Status    Discharge Delays None known at this time (P)                      Important Message from Medicare  Important Message from Medicare regarding Discharge Appeal Rights: Given to patient/caregiver, Explained to patient/caregiver, Signed/date by patient/caregiver     Date IMM was signed: 24  Time IMM was signed: 6464

## 2024-07-22 NOTE — ANESTHESIA PREPROCEDURE EVALUATION
07/22/2024  Laura Smith is a 79 y.o., female.      Pre-op Assessment    I have reviewed the Patient Summary Reports.     I have reviewed the Nursing Notes. I have reviewed the NPO Status.   I have reviewed the Medications.     Review of Systems  Anesthesia Hx:  No problems with previous Anesthesia             Denies Family Hx of Anesthesia complications.   Personal Hx of Anesthesia complications, Post-Operative Nausea/Vomiting                    Social:  Non-Smoker       Hematology/Oncology:    Oncology Normal    -- Anemia:                                  EENT/Dental:  EENT/Dental Normal           Cardiovascular:  Cardiovascular Normal Exercise tolerance: good                                           Pulmonary:  Pulmonary Normal                       Renal/:  Renal/ Normal                 Musculoskeletal:         Spine Disorders: lumbar Degenerative disease and Disc disease           Neurological:  Neurology Normal                                      Endocrine:   Hypothyroidism          Dermatological:  Skin Normal    Psych:    depression                Physical Exam  General: Well nourished, Cooperative, Oriented and Alert    Airway:  Mallampati: II / II  Mouth Opening: Normal  TM Distance: Normal  Neck ROM: Normal ROM    Dental:  Intact        Anesthesia Plan  Type of Anesthesia, risks & benefits discussed:    Anesthesia Type: Spinal  Intra-op Monitoring Plan: Standard ASA Monitors  Post Op Pain Control Plan: multimodal analgesia and peripheral nerve block  Induction:  IV  Airway Plan: Video and Direct  Informed Consent: Informed consent signed with the Patient and all parties understand the risks and agree with anesthesia plan.  All questions answered.   ASA Score: 2 Emergent  Day of Surgery Review of History & Physical: H&P Update referred to the surgeon/provider.  Anesthesia Plan  Notes: Plan PENG and spinal  HCT 34. Down from 40 prior to fracture    Ready For Surgery From Anesthesia Perspective.     .

## 2024-07-22 NOTE — TRANSFER OF CARE
"Anesthesia Transfer of Care Note    Patient: Laura Smith    Procedure(s) Performed: Procedure(s) (LRB):  ORIF, HIP (Left)    Patient location: PACU    Anesthesia Type: spinal    Transport from OR: Transported from OR on 2-3 L/min O2 by NC with adequate spontaneous ventilation    Post pain: adequate analgesia    Post assessment: no apparent anesthetic complications    Post vital signs: stable    Level of consciousness: awake    Nausea/Vomiting: no nausea/vomiting    Complications: none    Transfer of care protocol was followed      Last vitals: Visit Vitals  BP (!) 94/55 (BP Location: Right arm, Patient Position: Lying)   Pulse (!) 58   Temp 36.3 °C (97.4 °F) (Skin)   Resp 14   Ht 5' 5" (1.651 m)   Wt 71.7 kg (158 lb)   SpO2 100%   BMI 26.29 kg/m²     "

## 2024-07-22 NOTE — CHAPLAIN
07/22/24 1406   Clinical Encounter Type   Visit Type Initial Visit   Visit Category Code Blue;Death   Visited With Family;Health care provider;Patient  (Upon arrival response was already present and I was present during the procedure.)   Number of Family Visited 6  (Pt's 2 Daughters, 1 nephew, 2 female relatives, and 1 male relatives.)   Length of Visit 120 Minutes   Continue Visiting No   Restoration Encounters   Spiritual Resources Requested Prayer   Patient Spiritual Encounters   Care Provided Compassionate presence;Prayer support;Contacted patient's clergy  (Attempted to contact pt's clergy, but was unable.)   Family Spiritual Encounters   Care Provided Compassionate presence;Grief care;Contacted patient's clergy;Reflective listening;Explored pt/family concerns  (Attempted to contact pt's clergy on behalf of the family, but was unable to contact.)   Family Coping Family/ friends resources;Sadness;Anger;Accepting;Anxiety;Open/discussion;Active mirta;Internal strength  (Family was grieving appropriately)   Comments - Family Family expressed gratefulness for the Spiritual care  visit.  (Pt's daughters were the first to arrive. They were initially angry, but they seemed to calm down after a few minutes, afterwhich they appeared to be exeriencing sadness. Other family members arrived shortly after.)

## 2024-07-22 NOTE — PT/OT/SLP EVAL
"Physical Therapy Evaluation/Significant Event Note     Patient Name:  Laura Smith   MRN:  [id number]    Recommendations:   N/A  Assessment:     Laura Smith is a 79 y.o. female admitted with a medical diagnosis of Closed comminuted intertrochanteric fracture of left femur.      Recent Surgery: Procedure(s) (LRB):  ORIF, HIP (Left) Day of Surgery    Plan:     No goals set as pt had significant event during eval as noted below:  Plan of Care Expires:  07/22/24    Subjective     Chief Complaint: Pt reports pain is minimal, located in thigh  Patient/Family Comments/goals: Pt agreeable to PT eval   Pain/Comfort:  Pain Rating 1: 2/10  Location - Side 1: Left  Location 1: thigh  Pain Addressed 1: Cessation of Activity, Pre-medicate for activity    Patients cultural, spiritual, Muslim conflicts given the current situation: no    Living Environment:  2 SH with 3 ARIADNE and 2 steps down into bedroom, all needs can be met on 1st floor   Prior to admission, patients level of function was Independent although pt states "I have problems with my balance"- reports 4 falls this month, one last week which caused bruising to L hip and most recent fall yesterday which resulted in L hip fx.  Equipment used at home: none.  DME owned (not currently used): shower chair.  Upon discharge, patient will have assistance from lives alone, pt's  currently residing in a NH. Pt reports plan for one of her children to stay with her post-op.     Objective:     Communicated with DEE DEE Mattson prior to session.  Patient found supine with peripheral IV, oxygen, avina catheter, SCD upon PT entry to room.    General Precautions: Standard, fall  Orthopedic Precautions:LLE weight bearing as tolerated, LLE anterior precautions, no ER, no extension, no adduction, no active abduction therex   Braces: N/A  Respiratory Status: Nasal cannula, flow 2 L/min    Exams:  Sensation:    -       Intact  RLE ROM: WFL  RLE Strength: WFL  LLE " ROM: Hip flexion AROM 10 deg grossly, knee flexion 60 deg grossly, ankle WFL  LLE Strength: 2+/5 hip, 3-/5 knee, ankle WNL    Functional Mobility/Treatment:  Issued handout on anterior hip precautions and LE therex. Performed supine L LE therex, 1 set of 10 ea as follows: AP, QS and GS with 5 second hold, heelslide in tolerable range. T/F to sit at EOB Min A. Sit to stand with RW Min A, required cueing for hand placement. Gait 7' with RW Min A, step-to gait pattern. Pt then began to c/o dizziness and broke out into a sweat. Pt t/f to sit in bedside chair Total A and immediately became unresponsive. Pt reclined in chair, call light pressed and verbally called for RN assist from [facility]. 2 RNs immediately arrived and code blue called. Code Team arrived to room, pt left in chair with code team.       AM-PAC 6 CLICK MOBILITY  Total Score:18         History:     Past Medical History:   Diagnosis Date    Allergy     Arthritis     Depression     Lumbar disc disease     PONV (postoperative nausea and vomiting)     Thyroid disease        Past Surgical History:   Procedure Laterality Date    DILATION AND CURETTAGE OF UTERUS  07/11/2016    FRACTURE SURGERY      Right wrist, long ago     HYSTEROSCOPY  07/11/2016    JOINT REPLACEMENT      right TKA    KNEE SURGERY Right     X 2 2014 & 2015    LASER LAPAROSCOPY      age 36    SHOULDER SURGERY       right X 1 2005 left X 2 2006 & unkl     TONSILLECTOMY      as a child, 5 y/o     WRIST SURGERY Right        Time Tracking:     PT Received On: 07/22/24  PT Start Time: 1157     PT Stop Time: 1230  PT Total Time (min): 33 min     Billable Minutes: Evaluation 10 and Therapeutic Activity 16      07/22/2024

## 2024-07-22 NOTE — ANESTHESIA PROCEDURE NOTES
Spinal    Diagnosis: left hip fracture  Patient location during procedure: OR  Start time: 7/22/2024 7:44 AM    Staffing  Authorizing Provider: Moreno Rosa MD  Performing Provider: Moreno Rosa MD    Staffing  Performed by: Moreno Rosa MD  Authorized by: Moreno Rosa MD    Preanesthetic Checklist  Completed: patient identified, IV checked, site marked, risks and benefits discussed, surgical consent, monitors and equipment checked, pre-op evaluation and timeout performed  Spinal Block  Patient position: right lateral decubitus  Prep: ChloraPrep  Patient monitoring: cardiac monitor, continuous pulse ox, heart rate and frequent blood pressure checks  Approach: left paramedian  Location: L3-4  Injection technique: single shot  CSF Fluid: clear free-flowing CSF  Needle  Needle gauge: 22 G  Needle length: 4 in  Additional Documentation: incremental injection, negative aspiration for heme and no paresthesia on injection  Needle localization: anatomical landmarks  Assessment  Ease of block: moderate  Patient's tolerance of the procedure: no complaints and comfortable throughout block  Medications:    Medications: ropivacaine (NAROPIN) injection 0.5% - Intraspinal   3 mL - 7/22/2024 7:49:00 AM

## 2024-07-22 NOTE — CODE/ RAPID DOCUMENTATION
I responded to patient's room following CODE BLUE called overhead.  Patient was admitted yesterday after frequent falls and hip fracture with ORIF this morning.  PT/OT arrived to start postop therapy and patient was found unresponsive with agonal respirations.  When I arrived to the room patient was in chair being assisted with respirations with BVM and CPR in progress.  Patient had ROSC and we moved patient to bed, intubated and continued ACLS protocols.  CXR demonstrated good position of ETT without pneumothorax.  In addition to ACLS meds, tenecteplase given due to high suspicion of PE.  Bedside ultrasound revealed RV collapse and generally poor cardiac output.  Pulmonology/critical care fellow also at bedside as well as hospitalist Dr. Piña.  Critical care fellow placed central line. Unfortunately after maximal resuscitation efforts patient failed to maintain ROSC.  After discussion with family resuscitation efforts terminated.      Intubation    Date/Time: 7/22/2024 1:46 PM  Location procedure was performed: [facility] MEDICAL SURGICAL UNIT    Performed by: Talya Nam MD  Authorized by: Talya Nam MD  Consent Done: Emergent Situation  Indications: respiratory failure  Intubation method: direct  Patient status: unconscious  Preoxygenation: BVM  Laryngoscope size: Mac 4  Tube size: 7.5 mm  Tube type: cuffed  Number of attempts: 1  Ventilation between attempts: BVM  Cricoid pressure: yes  Cords visualized: yes  Post-procedure assessment: chest rise and ETCO2 monitor  Breath sounds: clear  Cuff inflated: yes  Tube secured with: ETT ames  Chest x-ray interpreted by other physician.  Chest x-ray findings: endotracheal tube in appropriate position  Complications: No  Implants: No

## 2024-07-22 NOTE — SIGNIFICANT EVENT
Death Note    Post-code noted asystole on monitor at 1339. No respirations or palpable pulses. Please see code documentation for details of code blue. House supervisor notified. Family notified and condolences offered.    Time of death:  07/22/2024 1339    Cause of Death:  Cardiac arrest suspected 2/2 fat emboli after fracture    CHRISTOPHER Hoang MD  [phone]

## 2024-07-22 NOTE — ANESTHESIA PROCEDURE NOTES
Left PENG    Patient location during procedure: pre-op   Block not for primary anesthetic.  Reason for block: at surgeon's request and post-op pain management   Post-op Pain Location: Left hip   Timeout: 7/22/2024 7:32 AM   End time: 7/22/2024 7:36 AM    Staffing  Authorizing Provider: Myke Parkinson MD  Performing Provider: Myke Parkinson MD    Staffing  Other anesthesia staff: Bonifacio Alatorre Jr., MD  Performed by: Myke Parkinson MD  Authorized by: Myke Parkinson MD    Preanesthetic Checklist  Completed: patient identified, IV checked, site marked, risks and benefits discussed, surgical consent, monitors and equipment checked, pre-op evaluation and timeout performed  Peripheral Block  Patient position: supine  Prep: ChloraPrep  Patient monitoring: heart rate, continuous pulse ox and cardiac monitor  Block type: PENG  Laterality: left  Injection technique: single shot  Needle  Needle type: Echogenic   Needle gauge: 20 G  Needle length: 4 in  Needle localization: ultrasound guidance   -ultrasound image captured on disc.  Assessment  Injection assessment: negative aspiration and negative parasthesia  Paresthesia pain: none  Heart rate change: no  Slow fractionated injection: yes  Pain Tolerance: comfortable throughout block and no complaints  Medications:    Medications: ropivacaine (NAROPIN) injection 0.5% - Perineural   20 mL - 7/22/2024 7:46:00 AM

## 2024-07-22 NOTE — PROCEDURES
"Laura Smith is a 79 y.o. female patient.    Temp: 97.7 °F (36.5 °C) (07/22/24 1024)  Pulse: 80 (07/22/24 1318)  Resp: 14 (07/22/24 1024)  BP: (!) 222/136 (07/22/24 1320)  SpO2: 98 % (07/22/24 1024)  Weight: 71.7 kg (158 lb) (07/21/24 1615)  Height: 5' 5" (165.1 cm) (07/21/24 1615)       Central Line    Date/Time: 7/22/2024 2:40 PM    Performed by: Zabrina Gupta MD  Authorized by: Zabrina Gupta MD    Location procedure was performed:  [facility] PULMONARY MEDICINE  Consent Done ?:  Emergent Situation  Indications:  Med administration  Preparation:  Skin prepped with ChloraPrep  Location:  Right femoral  Site selection rationale:  Emergent code situation  Catheter type:  Triple lumen  Catheter size:  7 Fr  Ultrasound guidance: Yes    Needle advanced into vessel with real time ultrasound guidance.    Manometry: No    Number of attempts:  1  Securement:  Line sutured, blood return through all ports and sterile dressing applied      7/22/2024    "

## 2024-07-22 NOTE — ASSESSMENT & PLAN NOTE
- Continue aripiprazole 2mg PO daily, escitalopram 20mg PO qAM, 10mg PO with lunch, 10mg PO qHS, lorazepam 2mg PO BID PRN.

## 2024-07-22 NOTE — ASSESSMENT & PLAN NOTE
Lumbar disc disease, recurrent falls  - Patient reports history of recurrent issues with falls, history of lumbar disc disease as well.  - XR demonstrating comminuted intertrochanteric fracture. Orthopedics consulted; plan for OR in AM.  - NPO after midnight. Hold anticoagulation pre-procedurally.  - Continue pain control with oxycodone-acetaminophen 5-325mg PO q4hr PRN, morphine 4mg IV q4hr PRN.  - PT/OT after procedure.

## 2024-07-22 NOTE — CONSULTS
Status post fall at [facility] yesterday injuring her left hip.  Sustaining comminuted left intertrochanteric fracture.  Plan is urgent ORIF.  Significant risks discussed pre and postoperative care discussed.  No other injuries or complaints.  Shortening external rotation left lower extremity consistent with fracture

## 2024-07-22 NOTE — NURSING
"CODE BLUE NURSE NOTE     Admit Date: 2024  LOS: 1  Code Status: Full Code   Date of Consult: 2024  : 1945  Age: 79 y.o.  Weight:   Wt Readings from Last 1 Encounters:   24 71.7 kg (158 lb)     Sex: female  Race: Patient Refused   Bed: [place] [address] A:   MRN: [id number]  Time CodeTeam page Received: 1215  Time Code Team at Bedside: 1219  Time Code Team left Bedside: 1340  Was the patient discharged from an ICU this admission?   No  Was the patient discharged from a PACU within last 24 hours?  No  Did the patient receive conscious sedation/general anesthesia within last 24 hours?  Yes  Was the patient in the ED within the past 24 hours?  No  Was the patient started on NIPPV within the past 24 hours?  No  Did this progress into an ARC or CPA:  No  Attending Physician: Dr. Piña  Primary Service: Hospital Medicine  Consult Requested By: Dr. Worrell (surgeon)  SITUATION     Reason for Call: Code Blue called Overhead      BACKGROUND     Why is the patient in the hospital?: Closed comminuted intertrochanteric fracture of left femur    Patient has a past medical history of Allergy, Arthritis, Depression, Lumbar disc disease, PONV (postoperative nausea and vomiting), and Thyroid disease.  Recent surgery: ORIF, HIP (left) Day of surgery    ASSESSMENT/INTERVENTIONS     BP (!) 222/136   Pulse 80   Temp 97.7 °F (36.5 °C)   Resp 14   Ht 5' 5" (1.651 m)   Wt 71.7 kg (158 lb)   SpO2 98%   BMI 26.29 kg/m²     What did you find: When I arrived to room patient was in chair being assisted with respirations with BVM and CPR. Patient regained ROSC and we moved patient to bed, intubated and continued ACLS protocols. In addition to ACLS meds tenecteplase given. Pulmonary/critical care fellow and hospitalist Dr. Piña at bedside, a discussion with family took place and family decided to terminate resuscitation efforts.     RECOMMENDATIONS     We recommend: Patient's family decided to terminate resuscitation " efforts     FOLLOW-UP/CONTINGENCY PLAN     Patient needs a second visit at : not applicable    Call the Rapid Response Nurse, BETTY JENKINS, RN at x [phone] for additional questions or concerns.    PHYSICIAN ESCALATION     Orders received and case discussed with  team, resuscitation efforts terminated per decision of patient's family. .    Disposition: noted asystole on monitor, pronounced at 1339 by Dr. Piña Please see code documentation for details of code blue.

## 2024-07-22 NOTE — HOSPITAL COURSE
Admitted with L intertrochanteric fracture and orthopedics consulted. Underwent ORIF 07/22. Post-operatively returned to room; during therapy session complained of dizziness, became diaphoretic and then unresponsive. Code blue called and ED, critical care teams responded. Extended attempts at resuscitation performed with ROSC on several occasions but with pulses lost recurrently; received teneceteplase as well without improvement. After 1hr 20min of attempted resuscitation, efforts were discontinued as futile.

## 2024-07-22 NOTE — PLAN OF CARE
Problem: Physical Therapy  Goal: Physical Therapy Goal  Description: No goals set due to significant event occurred during eval

## 2024-07-23 NOTE — DISCHARGE SUMMARY
[facility] ([address] Medicine  Discharge Summary      Patient Name: Laura Smith  MRN: [id number]  ALYSSA: [id number]  Patient Class: IP- Inpatient  Admission Date: 7/21/2024  Hospital Length of Stay: 1 days  Discharge Date and Time: 7/22/2024 3:32 PM  Attending Physician: No att. providers found   Discharging Provider: AUGUSTA Piña MD  Primary Care Provider: Ramon Lovell MD    Primary Care Team: Networked reference to record PCT     HPI:   Ms. Smith is a 79/F with PMH anxiety/depression, GERD, hypothyroidism, lumbar disc disease who presented to [facility] 07/21 after a fall the morning of presentation. Reports she was in her usual state of health when someone startled her unintentionally while walking causing her to turn to her left and fall onto her left side. She reports striking her head and left hip upon falling without loss of consciousness. She notes recurrent issues with balance and recurrent falls, with relatively recent episode causing a large bruise to her left hip before this fall. ED evaluation was notable for unremarkable labwork, CT Head/C-spine without acute abnormality, probably prior lacunar infarcts, moderate C-spine spondylosis, and XR L hip demonstrating angulated comminuted left intertrochanteric fracture. She received morphine IV and orthopedic surgery was consulted, recommending NPO after midnight for likely repair 07/22. Hospital medicine was contacted for admission.    Procedure(s) (LRB):  ORIF, HIP (Left)      Hospital Course:   Admitted with L intertrochanteric fracture and orthopedics consulted. Underwent ORIF 07/22. Post-operatively returned to room; during therapy session complained of dizziness, became diaphoretic and then unresponsive. Code blue called and ED, critical care teams responded. Extended attempts at resuscitation performed with ROSC on several occasions but with pulses lost recurrently; received teneceteplase as well without  improvement. After 1hr 20min of attempted resuscitation, efforts were discontinued as futile.     Goals of Care Treatment Preferences:  Code Status: Full Code      Consults:   Consults (From admission, onward)          Status Ordering Provider     Inpatient consult to Orthopedic Surgery  Once        Provider:  (Not yet assigned)    Completed CHRISTOPHER GLOVER            No new Assessment & Plan notes have been filed under this hospital service since the last note was generated.  Service: Hospital Medicine    Final Active Diagnoses:    Diagnosis Date Noted POA    PRINCIPAL PROBLEM:  Closed comminuted intertrochanteric fracture of left femur [S72.142A] 2024 Yes    Anxiety and depression [F41.9, F32.A] 2024 Yes     Chronic    Recurrent falls [R29.6] 2024 Not Applicable    GERD (gastroesophageal reflux disease) [K21.9] 2024 Yes     Chronic    Lumbar disc disease [M51.9] 2014 Yes     Chronic    Hypothyroidism [E03.9] 2012 Yes     Chronic      Problems Resolved During this Admission:       Discharged Condition:     Disposition:     Follow Up:    Patient Instructions:   No discharge procedures on file.    Significant Diagnostic Studies:   CBC:  Recent Labs   Lab 24  1151 24  0424 24  1250 24  1309 24  1320   WBC 6.30 8.41 9.63  --   --    HGB 13.6 11.1* 9.3*  --   --    HCT 41.4 34.1* 30.7* 19* 24*    229 103*  --   --    GRAN 62.3  3.9 77.2*  6.5 63.3  6.1  --   --    LYMPH 27.8  1.8 12.7*  1.1 29.1  2.8  --   --    MONO 7.8  0.5 8.7  0.7 5.3  0.5  --   --    EOS 0.1 0.1 0.0  --   --    BASO 0.03 0.02 0.02  --   --      BMP:  Recent Labs   Lab 24  1151 24  0424 24  1250    136 142   K 4.6 3.9 4.0    103 104   CO2 21* 23 17*   BUN 19 18 17   CREATININE 0.8 0.7 1.0   * 109 324*   CALCIUM 9.1 8.5* 9.2   MG  --  1.7  --    PHOS  --  3.7  --      CMP:  Recent Labs   Lab 24  1156  07/22/24  0424 07/22/24  1250    136 142   K 4.6 3.9 4.0    103 104   CO2 21* 23 17*   BUN 19 18 17   CREATININE 0.8 0.7 1.0   * 109 324*   CALCIUM 9.1 8.5* 9.2   MG  --  1.7  --    PHOS  --  3.7  --    ALKPHOS 114  --  78   AST 25  --  316*   ALT 18  --  317*   BILITOT 0.3  --  0.2   PROT 7.0  --  4.0*   ALBUMIN 3.7  --  2.1*   ANIONGAP 13 10 21*     Imaging Results               X-Ray Hip 2 or 3 views Left with Pelvis when performed (Final result)  Result time 07/21/24 11:36:05      Final result by Tino Spencer MD (07/21/24 11:36:05)                   Impression:      Angulated comminuted left intertrochanteric fracture.      Electronically signed by: Tino Spencer MD  Date:    07/21/2024  Time:    11:36               Narrative:    EXAMINATION:  XR HIP WITH PELVIS WHEN PERFORMED 2 OR 3 VIEWS LEFT    CLINICAL HISTORY:  Unspecified fall, initial encounter    TECHNIQUE:  AP view of the pelvis and frog leg lateral view of the left hip were performed.    COMPARISON:  None    FINDINGS:  There is a comminuted intertrochanteric fracture on the left with lateral angulation.  Underlying demineralization is present.  There is suggestion of chondrocalcinosis and osteophyte formation in both hips suggesting CPPD.  The bony pelvis appears intact. This report was flagged in Epic as abnormal.                                       CT Cervical Spine Without Contrast (Final result)  Result time 07/21/24 11:31:22      Final result by Tino Spencer MD (07/21/24 11:31:22)                   Impression:      No definite acute fracture is seen in the cervical  region in this patient with moderate spondylosis as is described above..    There is asymmetric fullness to the left of midline at the level of the epiglottis.  Whether this relates to a mucosal lesion or this date of distension is uncertain.  Clinical correlation is advised.      Electronically signed by: Tino Spencer  MD  Date:    07/21/2024  Time:    11:31               Narrative:    EXAMINATION:  CT CERVICAL SPINE WITHOUT CONTRAST    CLINICAL HISTORY:  fall;    TECHNIQUE:  Low dose axial images, sagittal and coronal reformations were performed though the cervical spine.  Contrast was not administered.    COMPARISON:  Prior report without images of from [facility] dated June 29, 2016    FINDINGS:  There is a minimal anterior subluxation of C4 relative to C5 measuring 2 mm.  There is a minimal anterior subluxation of C7 relative to T1 measuring 2 mm.  There is no evidence of fracture in the cervical region.  There is loss of disc space height most prominent at C5/C6 and C6/C7.  Degenerative changes are seen about the atlantoaxial ligament with mild distortion of the upper canal.  There is asymmetric fullness to the left of midline at the level of the epiglottis.  A mass lesion cannot be excluded and consideration of clinical follow-up is advised.  The individual disc levels appears follows:    C2/C3: Mild uncovertebral induced neural foraminal narrowing is noted bilaterally.  The central canal is intact.    C3/C4: Mild right greater than left-sided uncovertebral and facet induced neural foraminal narrowing is noted.  There is no evidence of significant canal stenosis.    C4/C5: Moderate uncovertebral and facet induced neural foraminal narrowing is noted bilaterally.  There is mild posterior osteophyte and disc complex with mildly efface the anterior thecal sac.    C5/C6: Moderate left and mild right-sided uncovertebral and facet induced neural foraminal narrowing are noted.  There is left paracentral disc osteophyte complex producing some distortion left anterolateral canal.    C6/C7: Moderate uncovertebral and facet induced neural foraminal narrowing is noted bilaterally.  Disc material and osteophyte formation cause mild distortion of the central canal.    C7/T1: Degenerative facet changes are noted and there is mild  narrowing of the canal both foramina.                                       CT Head Without Contrast (Final result)  Result time 07/21/24 11:21:12      Final result by Tino Spencer MD (07/21/24 11:21:12)                   Impression:      Mild frontal lobe predominant cerebral atrophy.    Probable prior lacunar infarcts in the right basal ganglia.    No evidence of acute infarct mass effect or abnormal extra-axial collection.    Mild right anterior ethmoid sinus mucosal thickening      Electronically signed by: Tino Spencer MD  Date:    07/21/2024  Time:    11:21               Narrative:    EXAMINATION:  CT HEAD WITHOUT CONTRAST    CLINICAL HISTORY:  fall;    TECHNIQUE:  Low dose axial images were obtained through the head.  Coronal and sagittal reformations were also performed. Contrast was not administered.    COMPARISON:  None.    FINDINGS:  There is evidence of intracranial atherosclerosis.  There is minimal mucosal thickening in the right anterior ethmoid air cell.  There is a well-defined low density in the right caudate head and questionably within the right thalamus x2.  This would suggest prior areas of lacunar infarction.  Brain and ventricles are otherwise unremarkable.  There is no evidence of mass effect or midline shift.  There is mild frontal lobe predominant cerebral atrophy.                                      Pending Diagnostic Studies:       None           Medications:  None    Indwelling Lines/Drains at time of discharge:   Lines/Drains/Airways       Central Venous Catheter Line  Duration             Percutaneous Central Line - Triple Lumen  07/22/24 1241 Femoral Vein Right;Femoral Right <1 day              Airway  Duration                  Airway - Non-Surgical -- days                    Time spent on the discharge of patient: 35 minutes         AUGUSTA Piña MD  Department of Hospital Medicine  [facility] ([address])

## (undated) DEVICE — Device

## (undated) DEVICE — WIRE GUIDE 3.2MM 400MM
Type: IMPLANTABLE DEVICE | Site: HIP | Status: NON-FUNCTIONAL
Removed: 2024-07-22

## (undated) DEVICE — SUT VICRYL 2-0 8-18 CP-2

## (undated) DEVICE — YANKAUER OPEN TIP W/O VENT

## (undated) DEVICE — GLOVE BIOGEL SKINSENSE PI 6.5

## (undated) DEVICE — SYR 30CC LUER LOCK

## (undated) DEVICE — SPONGE COTTON TRAY 4X4IN

## (undated) DEVICE — DRAPE STERI-DRAPE 83X125 IOBAN

## (undated) DEVICE — DRAPE XRAY EQUIPMENT UNIV

## (undated) DEVICE — NDL HYPO STD REG BVL 18GX1.5IN

## (undated) DEVICE — ELECTRODE REM PLYHSV RETURN 9

## (undated) DEVICE — SOL IRR SOD CHL .9% POUR

## (undated) DEVICE — CLOSURE SKIN STERI STRIP 1/2X4

## (undated) DEVICE — BANDAGE MATRIX HK LOOP 6IN 5YD

## (undated) DEVICE — UNDERGLOVES BIOGEL PI SZ 7 LF

## (undated) DEVICE — GLOVE BIOGEL SKINSENSE PI 8.5

## (undated) DEVICE — STAPLER SKIN ROTATING HEAD

## (undated) DEVICE — SUT VICRYL PLUS ANTIBACT

## (undated) DEVICE — PAD ABDOMINAL STERILE 8X10IN

## (undated) DEVICE — COVER HD BACK TABLE 6FT

## (undated) DEVICE — DRESSING GAUZE OIL EMUL 3X8

## (undated) DEVICE — PAD CAST SPECIALIST STRL 6

## (undated) DEVICE — APPLICATOR CHLORAPREP ORN 26ML